# Patient Record
Sex: FEMALE | Race: WHITE | NOT HISPANIC OR LATINO | Employment: OTHER | URBAN - METROPOLITAN AREA
[De-identification: names, ages, dates, MRNs, and addresses within clinical notes are randomized per-mention and may not be internally consistent; named-entity substitution may affect disease eponyms.]

---

## 2020-03-05 ASSESSMENT — ENCOUNTER SYMPTOMS
MYALGIAS: 0
NAUSEA: 0
DIZZINESS: 0
SPEECH CHANGE: 0
EYE IRRITATION: 0
MUSCLE CRAMPS: 0
POLYPHAGIA: 0
NECK PAIN: 1
FATIGUE: 0
NIGHT SWEATS: 0
RECTAL PAIN: 0
BACK PAIN: 1
DECREASED APPETITE: 0
NUMBNESS: 0
WEAKNESS: 0
SEIZURES: 0
WEIGHT LOSS: 0
TREMORS: 1
ALTERED TEMPERATURE REGULATION: 1
EYE WATERING: 0
INCREASED ENERGY: 1
MUSCLE WEAKNESS: 0
FEVER: 0
NERVOUS/ANXIOUS: 1
BLOATING: 1
INSOMNIA: 1
EYE REDNESS: 0
LOSS OF CONSCIOUSNESS: 0
STIFFNESS: 1
WEIGHT GAIN: 0
HEADACHES: 1
TINGLING: 0
DEPRESSION: 1
DECREASED CONCENTRATION: 0
PARALYSIS: 0
CONSTIPATION: 1
POLYDIPSIA: 0
HEARTBURN: 1
ABDOMINAL PAIN: 0
DIARRHEA: 1
JAUNDICE: 0
EYE PAIN: 0
DISTURBANCES IN COORDINATION: 0
BOWEL INCONTINENCE: 0
MEMORY LOSS: 0
ARTHRALGIAS: 1
DOUBLE VISION: 0
CHILLS: 0
JOINT SWELLING: 1
BLOOD IN STOOL: 0
HALLUCINATIONS: 0
PANIC: 0
VOMITING: 0

## 2020-03-09 ENCOUNTER — PRE VISIT (OUTPATIENT)
Dept: ONCOLOGY | Facility: CLINIC | Age: 65
End: 2020-03-09

## 2020-03-09 ENCOUNTER — ONCOLOGY VISIT (OUTPATIENT)
Dept: ONCOLOGY | Facility: CLINIC | Age: 65
End: 2020-03-09
Attending: OBSTETRICS & GYNECOLOGY
Payer: COMMERCIAL

## 2020-03-09 VITALS
WEIGHT: 135 LBS | TEMPERATURE: 98.2 F | BODY MASS INDEX: 26.5 KG/M2 | HEART RATE: 96 BPM | SYSTOLIC BLOOD PRESSURE: 134 MMHG | RESPIRATION RATE: 16 BRPM | HEIGHT: 60 IN | OXYGEN SATURATION: 100 % | DIASTOLIC BLOOD PRESSURE: 88 MMHG

## 2020-03-09 DIAGNOSIS — D07.1 VIN III (VULVAR INTRAEPITHELIAL NEOPLASIA III): Primary | ICD-10-CM

## 2020-03-09 PROBLEM — F41.8 DEPRESSION WITH ANXIETY: Status: ACTIVE | Noted: 2018-01-25

## 2020-03-09 PROBLEM — M19.90 OSTEOARTHRITIS: Status: ACTIVE | Noted: 2020-03-09

## 2020-03-09 PROBLEM — R25.1 TREMOR: Status: ACTIVE | Noted: 2018-01-25

## 2020-03-09 PROBLEM — G47.00 INSOMNIA: Status: ACTIVE | Noted: 2018-10-10

## 2020-03-09 PROBLEM — A60.00 GENITAL HERPES SIMPLEX: Status: ACTIVE | Noted: 2018-06-28

## 2020-03-09 PROBLEM — E03.9 HYPOTHYROIDISM: Status: ACTIVE | Noted: 2018-01-25

## 2020-03-09 PROBLEM — L90.0 LICHEN SCLEROSUS: Status: ACTIVE | Noted: 2019-03-27

## 2020-03-09 PROCEDURE — G0463 HOSPITAL OUTPT CLINIC VISIT: HCPCS | Mod: ZF

## 2020-03-09 PROCEDURE — 99214 OFFICE O/P EST MOD 30 MIN: CPT | Mod: 24 | Performed by: OBSTETRICS & GYNECOLOGY

## 2020-03-09 PROCEDURE — 56605 BIOPSY OF VULVA/PERINEUM: CPT | Mod: ZF | Performed by: OBSTETRICS & GYNECOLOGY

## 2020-03-09 PROCEDURE — 88305 TISSUE EXAM BY PATHOLOGIST: CPT | Performed by: OBSTETRICS & GYNECOLOGY

## 2020-03-09 RX ORDER — CHOLECALCIFEROL (VITAMIN D3) 50 MCG
2000 TABLET ORAL
COMMUNITY
End: 2022-11-15

## 2020-03-09 RX ORDER — SERTRALINE HYDROCHLORIDE 100 MG/1
100 TABLET, FILM COATED ORAL AT BEDTIME
COMMUNITY

## 2020-03-09 RX ORDER — LIOTHYRONINE SODIUM 5 UG/1
10 TABLET ORAL DAILY
COMMUNITY
Start: 2020-02-14

## 2020-03-09 RX ORDER — MAGNESIUM 200 MG
TABLET ORAL DAILY
COMMUNITY

## 2020-03-09 RX ORDER — LEVOTHYROXINE SODIUM 112 UG/1
TABLET ORAL DAILY
COMMUNITY
Start: 2020-02-13

## 2020-03-09 RX ORDER — VALACYCLOVIR HYDROCHLORIDE 1 G/1
TABLET, FILM COATED ORAL
COMMUNITY
Start: 2020-02-23 | End: 2020-06-02

## 2020-03-09 RX ORDER — BUPROPION HYDROCHLORIDE 300 MG/1
TABLET ORAL EVERY MORNING
COMMUNITY
Start: 2020-03-07

## 2020-03-09 SDOH — HEALTH STABILITY: MENTAL HEALTH: HOW MANY STANDARD DRINKS CONTAINING ALCOHOL DO YOU HAVE ON A TYPICAL DAY?: NOT ASKED

## 2020-03-09 ASSESSMENT — PAIN SCALES - GENERAL: PAINLEVEL: NO PAIN (0)

## 2020-03-09 ASSESSMENT — MIFFLIN-ST. JEOR: SCORE: 1096.35

## 2020-03-09 NOTE — NURSING NOTE
"Oncology Rooming Note    March 9, 2020 11:32 AM   Carly Diaz is a 64 year old female who presents for:    Chief Complaint   Patient presents with     Oncology Clinic Visit     New; Lichen sclerosus et atrophicus of the vulva and BRANDY 3; Post op     Initial Vitals: BP (!) 137/91   Pulse 96   Temp 98.2  F (36.8  C) (Oral)   Resp 16   Ht 1.536 m (5' 0.47\")   Wt 61.2 kg (135 lb)   LMP  (LMP Unknown)   SpO2 100%   Breastfeeding No   BMI 25.96 kg/m   Estimated body mass index is 25.96 kg/m  as calculated from the following:    Height as of this encounter: 1.536 m (5' 0.47\").    Weight as of this encounter: 61.2 kg (135 lb). Body surface area is 1.62 meters squared.  No Pain (0) Comment: Data Unavailable   No LMP recorded (lmp unknown). Patient has had a hysterectomy.  Allergies reviewed: Yes  Medications reviewed: Yes    Medications: Medication refills not needed today.  Pharmacy name entered into [facility]: [id number] - [place], [place] - [address] [facility]    Clinical concerns: Lichen; Post op       Randa Phan CMA              "
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
## 2020-03-11 ENCOUNTER — HEALTH MAINTENANCE LETTER (OUTPATIENT)
Age: 65
End: 2020-03-11

## 2020-03-11 LAB — COPATH REPORT: NORMAL

## 2020-03-16 ENCOUNTER — PATIENT OUTREACH (OUTPATIENT)
Dept: ONCOLOGY | Facility: CLINIC | Age: 65
End: 2020-03-16

## 2020-03-16 NOTE — PROGRESS NOTES
Spoke to Carly about the situation here at the [facility] with the current CV-19 precautions.  Explained to Carly that Dr. Anthony is meeting with the hospital and fellow surgeons to make a plan going forward regarding up coming surgeries.  Most likely they will be moving out or putting off non-emergent surgeries off until further notice.  Patient anxious since her VIN3 came back so quickly. Reassured patient that I will relay her concern with Dr. Anthony and discuss possible options that might be available between now and when surgery can take place.  Patient verbalizes understanding and agrees with plan.

## 2020-03-16 NOTE — PATIENT INSTRUCTIONS
2 biopsies done today, will be notified with test results  Follow up in 3 months if biopsy is negative

## 2020-03-16 NOTE — NURSING NOTE
Biopsy done today, orders entered, specimen sent to pathology  Return appt in 3 months if bx negative

## 2020-03-24 NOTE — PROGRESS NOTES
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 Explained to her we are very sorry for this unfortunate time and that I am willing to discuss with Dr. Anthony possible options to try including Aldara cream in the time between now and when we are able to do minor procedures.  Patient verbalizes understanding knows I will get back to her after discussing her plan with Dr. Anthony.

## 2020-03-27 DIAGNOSIS — D07.1 VIN III (VULVAR INTRAEPITHELIAL NEOPLASIA III): Primary | ICD-10-CM

## 2020-03-27 RX ORDER — IMIQUIMOD 12.5 MG/.25G
1 CREAM TOPICAL
Status: DISCONTINUED | OUTPATIENT
Start: 2020-03-27 | End: 2020-03-27 | Stop reason: CLARIF

## 2020-03-27 RX ORDER — IMIQUIMOD 12.5 MG/.25G
CREAM TOPICAL
Qty: 30 PACKET | Refills: 0 | Status: ON HOLD | OUTPATIENT
Start: 2020-03-27 | End: 2020-06-03

## 2020-05-12 DIAGNOSIS — D07.1 VIN III (VULVAR INTRAEPITHELIAL NEOPLASIA III): Primary | ICD-10-CM

## 2020-05-12 RX ORDER — PHENAZOPYRIDINE HYDROCHLORIDE 200 MG/1
200 TABLET, FILM COATED ORAL ONCE
Status: CANCELLED | OUTPATIENT
Start: 2020-05-12 | End: 2020-05-12

## 2020-05-12 RX ORDER — CIPROFLOXACIN 2 MG/ML
400 INJECTION, SOLUTION INTRAVENOUS
Status: CANCELLED | OUTPATIENT
Start: 2020-05-12

## 2020-05-12 RX ORDER — CIPROFLOXACIN 2 MG/ML
400 INJECTION, SOLUTION INTRAVENOUS SEE ADMIN INSTRUCTIONS
Status: CANCELLED | OUTPATIENT
Start: 2020-05-12

## 2020-05-18 ENCOUNTER — DOCUMENTATION ONLY (OUTPATIENT)
Dept: ONCOLOGY | Facility: CLINIC | Age: 65
End: 2020-05-18

## 2020-05-18 DIAGNOSIS — Z11.59 ENCOUNTER FOR SCREENING FOR OTHER VIRAL DISEASES: Primary | ICD-10-CM

## 2020-05-18 NOTE — PROGRESS NOTES
Surgery is scheduled with Dr. Anthony on 6/3 at [facility].  Scheduled per availability.    H&P: to be completed by surgeon.  POST-OP: Will be scheduled by patient/clinic.    The RN completed the education regarding the surgery.     The surgery packet was provided that contains surgical instructions and a map.     They are aware that they will receive a call  ~2 days prior to the scheduled procedure and will be given an exact arrival/start time.    Per communication - I did not need to call the patient to provide any extra additional information. I will follow up if anything changes.

## 2020-05-19 DIAGNOSIS — Z01.818 PREOP GENERAL PHYSICAL EXAM: Primary | ICD-10-CM

## 2020-05-31 DIAGNOSIS — D07.1 VIN III (VULVAR INTRAEPITHELIAL NEOPLASIA III): Primary | ICD-10-CM

## 2020-06-01 ENCOUNTER — PATIENT OUTREACH (OUTPATIENT)
Dept: ONCOLOGY | Facility: CLINIC | Age: 65
End: 2020-06-01

## 2020-06-01 DIAGNOSIS — Z01.818 PREOP GENERAL PHYSICAL EXAM: ICD-10-CM

## 2020-06-01 DIAGNOSIS — Z11.59 ENCOUNTER FOR SCREENING FOR OTHER VIRAL DISEASES: ICD-10-CM

## 2020-06-01 LAB
ALBUMIN SERPL-MCNC: 3.9 G/DL (ref 3.4–5)
ALP SERPL-CCNC: 76 U/L (ref 40–150)
ALT SERPL W P-5'-P-CCNC: 30 U/L (ref 0–50)
ANION GAP SERPL CALCULATED.3IONS-SCNC: 6 MMOL/L (ref 3–14)
AST SERPL W P-5'-P-CCNC: 12 U/L (ref 0–45)
BASOPHILS # BLD AUTO: 0 10E9/L (ref 0–0.2)
BASOPHILS NFR BLD AUTO: 0.3 %
BILIRUB SERPL-MCNC: 0.3 MG/DL (ref 0.2–1.3)
BUN SERPL-MCNC: 15 MG/DL (ref 7–30)
CALCIUM SERPL-MCNC: 9.3 MG/DL (ref 8.5–10.1)
CHLORIDE SERPL-SCNC: 107 MMOL/L (ref 94–109)
CO2 SERPL-SCNC: 29 MMOL/L (ref 20–32)
CREAT SERPL-MCNC: 0.77 MG/DL (ref 0.52–1.04)
DIFFERENTIAL METHOD BLD: NORMAL
EOSINOPHIL # BLD AUTO: 0.1 10E9/L (ref 0–0.7)
EOSINOPHIL NFR BLD AUTO: 1.2 %
ERYTHROCYTE [DISTWIDTH] IN BLOOD BY AUTOMATED COUNT: 12.2 % (ref 10–15)
GFR SERPL CREATININE-BSD FRML MDRD: 81 ML/MIN/{1.73_M2}
GLUCOSE SERPL-MCNC: 98 MG/DL (ref 70–99)
HCT VFR BLD AUTO: 37.3 % (ref 35–47)
HGB BLD-MCNC: 12.4 G/DL (ref 11.7–15.7)
IMM GRANULOCYTES # BLD: 0 10E9/L (ref 0–0.4)
IMM GRANULOCYTES NFR BLD: 0.3 %
LYMPHOCYTES # BLD AUTO: 2.3 10E9/L (ref 0.8–5.3)
LYMPHOCYTES NFR BLD AUTO: 35.7 %
MCH RBC QN AUTO: 32.1 PG (ref 26.5–33)
MCHC RBC AUTO-ENTMCNC: 33.2 G/DL (ref 31.5–36.5)
MCV RBC AUTO: 97 FL (ref 78–100)
MONOCYTES # BLD AUTO: 0.7 10E9/L (ref 0–1.3)
MONOCYTES NFR BLD AUTO: 11 %
NEUTROPHILS # BLD AUTO: 3.4 10E9/L (ref 1.6–8.3)
NEUTROPHILS NFR BLD AUTO: 51.5 %
NRBC # BLD AUTO: 0 10*3/UL
NRBC BLD AUTO-RTO: 0 /100
PLATELET # BLD AUTO: 211 10E9/L (ref 150–450)
POTASSIUM SERPL-SCNC: 3.6 MMOL/L (ref 3.4–5.3)
PROT SERPL-MCNC: 7.2 G/DL (ref 6.8–8.8)
RBC # BLD AUTO: 3.86 10E12/L (ref 3.8–5.2)
SODIUM SERPL-SCNC: 141 MMOL/L (ref 133–144)
WBC # BLD AUTO: 6.5 10E9/L (ref 4–11)

## 2020-06-01 RX ORDER — LIDOCAINE/PRILOCAINE 2.5 %-2.5%
CREAM (GRAM) TOPICAL
Qty: 30 G | Refills: 0 | Status: ON HOLD | OUTPATIENT
Start: 2020-06-01 | End: 2020-06-03

## 2020-06-01 NOTE — TELEPHONE ENCOUNTER
patient calling for refill of emla for the patient  Last visit with MD 3/9/20    Last order date  Unknown done through [facility]    MD reviewed refill request  VO  Dr. Gabrielle javier to refill emla cream  rx sent to pharmacy

## 2020-06-01 NOTE — PROGRESS NOTES
Pt left message on voicemail  [facility] cannot do blood draw as they need orders that are signed by MD  Pt wants to do labs here at clinic today

## 2020-06-01 NOTE — PROGRESS NOTES
Spoke with pt who states she has a covid 19 test this pm  Would like preop labs drawn at same time  Lab appt made for pt  Orders are in

## 2020-06-02 ENCOUNTER — ANESTHESIA EVENT (OUTPATIENT)
Dept: SURGERY | Facility: CLINIC | Age: 65
End: 2020-06-02
Payer: COMMERCIAL

## 2020-06-02 LAB
SARS-COV-2 RNA SPEC QL NAA+PROBE: NOT DETECTED
SPECIMEN SOURCE: NORMAL

## 2020-06-03 ENCOUNTER — SURGERY (OUTPATIENT)
Age: 65
End: 2020-06-03
Payer: COMMERCIAL

## 2020-06-03 ENCOUNTER — HOSPITAL ENCOUNTER (OUTPATIENT)
Facility: CLINIC | Age: 65
Discharge: HOME | End: 2020-06-03
Attending: OBSTETRICS & GYNECOLOGY | Admitting: OBSTETRICS & GYNECOLOGY
Payer: COMMERCIAL

## 2020-06-03 ENCOUNTER — ANESTHESIA (OUTPATIENT)
Dept: SURGERY | Facility: CLINIC | Age: 65
End: 2020-06-03
Payer: COMMERCIAL

## 2020-06-03 VITALS
BODY MASS INDEX: 25.64 KG/M2 | TEMPERATURE: 98.2 F | RESPIRATION RATE: 18 BRPM | OXYGEN SATURATION: 99 % | SYSTOLIC BLOOD PRESSURE: 118 MMHG | DIASTOLIC BLOOD PRESSURE: 75 MMHG | WEIGHT: 139.33 LBS | HEART RATE: 69 BPM | HEIGHT: 62 IN

## 2020-06-03 DIAGNOSIS — D07.1 VIN III (VULVAR INTRAEPITHELIAL NEOPLASIA III): ICD-10-CM

## 2020-06-03 LAB — GLUCOSE BLDC GLUCOMTR-MCNC: 118 MG/DL (ref 70–99)

## 2020-06-03 PROCEDURE — 88305 TISSUE EXAM BY PATHOLOGIST: CPT | Performed by: OBSTETRICS & GYNECOLOGY

## 2020-06-03 PROCEDURE — 25800030 ZZH RX IP 258 OP 636: Performed by: ANESTHESIOLOGY

## 2020-06-03 PROCEDURE — 71000027 ZZH RECOVERY PHASE 2 EACH 15 MINS: Performed by: OBSTETRICS & GYNECOLOGY

## 2020-06-03 PROCEDURE — 56605 BIOPSY OF VULVA/PERINEUM: CPT | Performed by: OBSTETRICS & GYNECOLOGY

## 2020-06-03 PROCEDURE — 40000170 ZZH STATISTIC PRE-PROCEDURE ASSESSMENT II: Performed by: OBSTETRICS & GYNECOLOGY

## 2020-06-03 PROCEDURE — 84702 CHORIONIC GONADOTROPIN TEST: CPT | Performed by: OBSTETRICS & GYNECOLOGY

## 2020-06-03 PROCEDURE — 25000128 H RX IP 250 OP 636: Performed by: OBSTETRICS & GYNECOLOGY

## 2020-06-03 PROCEDURE — 25000125 ZZHC RX 250: Performed by: OBSTETRICS & GYNECOLOGY

## 2020-06-03 PROCEDURE — 25000132 ZZH RX MED GY IP 250 OP 250 PS 637: Performed by: OBSTETRICS & GYNECOLOGY

## 2020-06-03 PROCEDURE — 56501 DESTROY VULVA LESIONS SIM: CPT | Performed by: OBSTETRICS & GYNECOLOGY

## 2020-06-03 PROCEDURE — 25800030 ZZH RX IP 258 OP 636: Performed by: NURSE ANESTHETIST, CERTIFIED REGISTERED

## 2020-06-03 PROCEDURE — 27210794 ZZH OR GENERAL SUPPLY STERILE: Performed by: OBSTETRICS & GYNECOLOGY

## 2020-06-03 PROCEDURE — 25000132 ZZH RX MED GY IP 250 OP 250 PS 637: Performed by: ANESTHESIOLOGY

## 2020-06-03 PROCEDURE — 25000128 H RX IP 250 OP 636: Performed by: NURSE ANESTHETIST, CERTIFIED REGISTERED

## 2020-06-03 PROCEDURE — 36000051 ZZH SURGERY LEVEL 2 1ST 30 MIN - UMMC: Performed by: OBSTETRICS & GYNECOLOGY

## 2020-06-03 PROCEDURE — 37000008 ZZH ANESTHESIA TECHNICAL FEE, 1ST 30 MIN: Performed by: OBSTETRICS & GYNECOLOGY

## 2020-06-03 PROCEDURE — 36000053 ZZH SURGERY LEVEL 2 EA 15 ADDTL MIN - UMMC: Performed by: OBSTETRICS & GYNECOLOGY

## 2020-06-03 PROCEDURE — 71000014 ZZH RECOVERY PHASE 1 LEVEL 2 FIRST HR: Performed by: OBSTETRICS & GYNECOLOGY

## 2020-06-03 PROCEDURE — 37000009 ZZH ANESTHESIA TECHNICAL FEE, EACH ADDTL 15 MIN: Performed by: OBSTETRICS & GYNECOLOGY

## 2020-06-03 PROCEDURE — 25000125 ZZHC RX 250: Performed by: NURSE ANESTHETIST, CERTIFIED REGISTERED

## 2020-06-03 PROCEDURE — 82962 GLUCOSE BLOOD TEST: CPT

## 2020-06-03 RX ORDER — BUPIVACAINE HYDROCHLORIDE 2.5 MG/ML
INJECTION, SOLUTION INFILTRATION; PERINEURAL PRN
Status: DISCONTINUED | OUTPATIENT
Start: 2020-06-03 | End: 2020-06-03 | Stop reason: HOSPADM

## 2020-06-03 RX ORDER — MEPERIDINE HYDROCHLORIDE 25 MG/ML
12.5 INJECTION INTRAMUSCULAR; INTRAVENOUS; SUBCUTANEOUS
Status: DISCONTINUED | OUTPATIENT
Start: 2020-06-03 | End: 2020-06-03 | Stop reason: HOSPADM

## 2020-06-03 RX ORDER — DEXAMETHASONE SODIUM PHOSPHATE 4 MG/ML
INJECTION, SOLUTION INTRA-ARTICULAR; INTRALESIONAL; INTRAMUSCULAR; INTRAVENOUS; SOFT TISSUE PRN
Status: DISCONTINUED | OUTPATIENT
Start: 2020-06-03 | End: 2020-06-03

## 2020-06-03 RX ORDER — LIDOCAINE 40 MG/G
CREAM TOPICAL
Status: DISCONTINUED | OUTPATIENT
Start: 2020-06-03 | End: 2020-06-03 | Stop reason: HOSPADM

## 2020-06-03 RX ORDER — ONDANSETRON 4 MG/1
4 TABLET, ORALLY DISINTEGRATING ORAL EVERY 30 MIN PRN
Status: DISCONTINUED | OUTPATIENT
Start: 2020-06-03 | End: 2020-06-03 | Stop reason: HOSPADM

## 2020-06-03 RX ORDER — FENTANYL CITRATE 50 UG/ML
INJECTION, SOLUTION INTRAMUSCULAR; INTRAVENOUS PRN
Status: DISCONTINUED | OUTPATIENT
Start: 2020-06-03 | End: 2020-06-03

## 2020-06-03 RX ORDER — CIPROFLOXACIN 2 MG/ML
400 INJECTION, SOLUTION INTRAVENOUS SEE ADMIN INSTRUCTIONS
Status: DISCONTINUED | OUTPATIENT
Start: 2020-06-03 | End: 2020-06-03 | Stop reason: HOSPADM

## 2020-06-03 RX ORDER — FENTANYL CITRATE 50 UG/ML
25-50 INJECTION, SOLUTION INTRAMUSCULAR; INTRAVENOUS
Status: DISCONTINUED | OUTPATIENT
Start: 2020-06-03 | End: 2020-06-03 | Stop reason: HOSPADM

## 2020-06-03 RX ORDER — PROPOFOL 10 MG/ML
INJECTION, EMULSION INTRAVENOUS PRN
Status: DISCONTINUED | OUTPATIENT
Start: 2020-06-03 | End: 2020-06-03

## 2020-06-03 RX ORDER — PHENAZOPYRIDINE HYDROCHLORIDE 200 MG/1
200 TABLET, FILM COATED ORAL ONCE
Status: COMPLETED | OUTPATIENT
Start: 2020-06-03 | End: 2020-06-03

## 2020-06-03 RX ORDER — ACETAMINOPHEN 325 MG/1
975 TABLET ORAL ONCE
Status: COMPLETED | OUTPATIENT
Start: 2020-06-03 | End: 2020-06-03

## 2020-06-03 RX ORDER — ACETIC ACID 5 %
LIQUID (ML) MISCELLANEOUS PRN
Status: DISCONTINUED | OUTPATIENT
Start: 2020-06-03 | End: 2020-06-03 | Stop reason: HOSPADM

## 2020-06-03 RX ORDER — PROPOFOL 10 MG/ML
INJECTION, EMULSION INTRAVENOUS CONTINUOUS PRN
Status: DISCONTINUED | OUTPATIENT
Start: 2020-06-03 | End: 2020-06-03

## 2020-06-03 RX ORDER — CIPROFLOXACIN 2 MG/ML
400 INJECTION, SOLUTION INTRAVENOUS
Status: DISCONTINUED | OUTPATIENT
Start: 2020-06-03 | End: 2020-06-03 | Stop reason: HOSPADM

## 2020-06-03 RX ORDER — SODIUM CHLORIDE, SODIUM LACTATE, POTASSIUM CHLORIDE, CALCIUM CHLORIDE 600; 310; 30; 20 MG/100ML; MG/100ML; MG/100ML; MG/100ML
INJECTION, SOLUTION INTRAVENOUS CONTINUOUS
Status: DISCONTINUED | OUTPATIENT
Start: 2020-06-03 | End: 2020-06-03 | Stop reason: HOSPADM

## 2020-06-03 RX ORDER — ONDANSETRON 2 MG/ML
INJECTION INTRAMUSCULAR; INTRAVENOUS PRN
Status: DISCONTINUED | OUTPATIENT
Start: 2020-06-03 | End: 2020-06-03

## 2020-06-03 RX ORDER — HYDROCODONE BITARTRATE AND ACETAMINOPHEN 5; 325 MG/1; MG/1
1 TABLET ORAL EVERY 6 HOURS PRN
Qty: 12 TABLET | Refills: 0 | Status: SHIPPED | OUTPATIENT
Start: 2020-06-03 | End: 2021-03-16

## 2020-06-03 RX ORDER — LIDOCAINE HYDROCHLORIDE 20 MG/ML
JELLY TOPICAL EVERY 8 HOURS PRN
Status: ON HOLD | COMMUNITY
Start: 2020-06-03 | End: 2021-03-26

## 2020-06-03 RX ORDER — NALOXONE HYDROCHLORIDE 0.4 MG/ML
.1-.4 INJECTION, SOLUTION INTRAMUSCULAR; INTRAVENOUS; SUBCUTANEOUS
Status: DISCONTINUED | OUTPATIENT
Start: 2020-06-03 | End: 2020-06-03 | Stop reason: HOSPADM

## 2020-06-03 RX ORDER — LIDOCAINE HYDROCHLORIDE 20 MG/ML
INJECTION, SOLUTION INFILTRATION; PERINEURAL PRN
Status: DISCONTINUED | OUTPATIENT
Start: 2020-06-03 | End: 2020-06-03

## 2020-06-03 RX ORDER — OXYCODONE HYDROCHLORIDE 5 MG/1
5 TABLET ORAL EVERY 4 HOURS PRN
Status: DISCONTINUED | OUTPATIENT
Start: 2020-06-03 | End: 2020-06-03 | Stop reason: HOSPADM

## 2020-06-03 RX ORDER — BACITRACIN ZINC 500 [USP'U]/G
OINTMENT TOPICAL 2 TIMES DAILY
COMMUNITY
Start: 2020-06-03 | End: 2022-11-15

## 2020-06-03 RX ORDER — ONDANSETRON 2 MG/ML
4 INJECTION INTRAMUSCULAR; INTRAVENOUS EVERY 30 MIN PRN
Status: DISCONTINUED | OUTPATIENT
Start: 2020-06-03 | End: 2020-06-03 | Stop reason: HOSPADM

## 2020-06-03 RX ORDER — HYDROMORPHONE HYDROCHLORIDE 1 MG/ML
.3-.5 INJECTION, SOLUTION INTRAMUSCULAR; INTRAVENOUS; SUBCUTANEOUS EVERY 10 MIN PRN
Status: DISCONTINUED | OUTPATIENT
Start: 2020-06-03 | End: 2020-06-03 | Stop reason: HOSPADM

## 2020-06-03 RX ORDER — OXYCODONE AND ACETAMINOPHEN 5; 325 MG/1; MG/1
1 TABLET ORAL EVERY 6 HOURS PRN
Qty: 20 TABLET | Refills: 0 | Status: SHIPPED | OUTPATIENT
Start: 2020-06-03 | End: 2020-06-03

## 2020-06-03 RX ADMIN — ONDANSETRON 4 MG: 2 INJECTION INTRAMUSCULAR; INTRAVENOUS at 08:05

## 2020-06-03 RX ADMIN — PROPOFOL 150 MCG/KG/MIN: 10 INJECTION, EMULSION INTRAVENOUS at 07:34

## 2020-06-03 RX ADMIN — PHENAZOPYRIDINE HYDROCHLORIDE 200 MG: 200 TABLET ORAL at 07:09

## 2020-06-03 RX ADMIN — PHENYLEPHRINE HYDROCHLORIDE 50 MCG: 10 INJECTION INTRAVENOUS at 08:02

## 2020-06-03 RX ADMIN — PHENYLEPHRINE HYDROCHLORIDE 50 MCG: 10 INJECTION INTRAVENOUS at 07:47

## 2020-06-03 RX ADMIN — SODIUM CHLORIDE, POTASSIUM CHLORIDE, SODIUM LACTATE AND CALCIUM CHLORIDE: 600; 310; 30; 20 INJECTION, SOLUTION INTRAVENOUS at 07:34

## 2020-06-03 RX ADMIN — PHENYLEPHRINE HYDROCHLORIDE 100 MCG: 10 INJECTION INTRAVENOUS at 08:11

## 2020-06-03 RX ADMIN — ACETAMINOPHEN 975 MG: 325 TABLET, FILM COATED ORAL at 07:09

## 2020-06-03 RX ADMIN — MIDAZOLAM 2 MG: 1 INJECTION INTRAMUSCULAR; INTRAVENOUS at 07:27

## 2020-06-03 RX ADMIN — PROPOFOL 150 MCG/KG/MIN: 10 INJECTION, EMULSION INTRAVENOUS at 07:05

## 2020-06-03 RX ADMIN — Medication 60 ML: at 08:31

## 2020-06-03 RX ADMIN — DEXAMETHASONE SODIUM PHOSPHATE 6 MG: 4 INJECTION, SOLUTION INTRA-ARTICULAR; INTRALESIONAL; INTRAMUSCULAR; INTRAVENOUS; SOFT TISSUE at 07:34

## 2020-06-03 RX ADMIN — PROPOFOL 200 MG: 10 INJECTION, EMULSION INTRAVENOUS at 07:34

## 2020-06-03 RX ADMIN — FENTANYL CITRATE 50 MCG: 50 INJECTION, SOLUTION INTRAMUSCULAR; INTRAVENOUS at 07:57

## 2020-06-03 RX ADMIN — LIDOCAINE HYDROCHLORIDE 80 MG: 20 INJECTION, SOLUTION INFILTRATION; PERINEURAL at 07:34

## 2020-06-03 RX ADMIN — BUPIVACAINE HYDROCHLORIDE 10 ML: 2.5 INJECTION, SOLUTION INFILTRATION; PERINEURAL at 07:55

## 2020-06-03 ASSESSMENT — MIFFLIN-ST. JEOR: SCORE: 1140.25

## 2020-06-03 NOTE — OP NOTE
Procedure Date: 06/03/2020      PREOPERATIVE DIAGNOSIS:  Grade vulvar dysplasia previously involving the left labia now involving the right labia.      POSTOPERATIVE DIAGNOSIS:  Grade vulvar dysplasia previously involving the left labia now involving the right labia.      PROCEDURE:  Examination under anesthesia, biopsy of right vulva, ablation of right and left vulvar lesions using cautery.      ANESTHESIA:  LMA.      ATTENDING:  José Miguel Anthony MD      ASSISTANT:  None.      COMPLICATIONS:  None.      ESTIMATED BLOOD LOSS:  Approximately 5 mL.      INTRAVENOUS FLUIDS:  Approximately 500 mL crystalloid.      COMPLICATIONS:  None.      TUBES AND DRAINS:  None.      DESCRIPTION OF PROCEDURE:  After obtaining informed consent, the patient was brought to the operating room, placed in supine position.  She underwent the induction of general anesthesia by LMA and was placed in dorsal lithotomy position.  She was examined under anesthesia.  Prepped and draped in the usual sterile fashion and acetic acid was then applied liberally to the vulva using gauze. After multiple minutes the vulva, mons, perineum and perianal areas as well as the distal vagina were all examined and noted to be much, much improved from her previous evaluation.  This was highly encouraging.  The patient had been using Aldara cream at home.  There was, however, a few small lesions on the right and one area of abnormality on the left labia minora.  The worst lesion was biopsied using a 15 blade and sewn closed using 4-0 Monocryl suture.  This was at the midportion of the labia and labeled mid right vulva biopsy.  The anterior aspect of the right vulva as well as the labia minora of the left vulva were then cauterized to a complete epidermal thickness.  These areas were infiltrated with Marcaine prior to the cautery and there was minimal bleeding.  At the conclusion the vulva, mons, perineum and perianal areas were then reevaluated no additional  lesions were identified and the procedure was terminated.  Lidocaine jelly and bacitracin were placed over the incisions.  No antibiotics were given preoperatively.  The patient tolerated the procedure well, was extubated in the operating room.         KARYN KIRKLAND MD             D: 2020   T: 2020   MT:       Name:     HANNAH GRAY   MRN:      [id number]        Account:        [id number]   :      1955           Procedure Date: 2020      Document: [id number]

## 2020-06-03 NOTE — ANESTHESIA CARE TRANSFER NOTE
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    Anesthesia Type:   General     Note:  Airway :Face Mask  Patient transferred to:PACU  Comments: Awake with good patent airway.  VSSHandoff Report: Identifed the Patient, Identified the Reponsible Provider, Reviewed the pertinent medical history, Discussed the surgical course, Reviewed Intra-OP anesthesia mangement and issues during anesthesia, Set expectations for post-procedure period and Allowed opportunity for questions and acknowledgement of understanding      Vitals: (Last set prior to Anesthesia Care Transfer)    CRNA VITALS  6/3/2020 0751 - 6/3/2020 0826      6/3/2020             Pulse:  69    SpO2:  100 %      CRNA VITALS  6/3/2020 0751 - 6/3/2020 0826      6/3/2020             Pulse:  69    SpO2:  100 %                Electronically Signed By: NUA Luther CRNA  Brina 3, 2020  8:26 AM

## 2020-06-03 NOTE — DISCHARGE INSTRUCTIONS
[facility], [place]  Take it easy when you get home.  Remember, same day surgery DOES NOT MEAN SAME DAY RECOVERY! Healing is a gradual process.   You will need some time to recover- you may be more tired than you realize at first.  Rest and relax for at least the first 24 hours at home.  You'll feel better and heal faster if you take good care of yourself.   Same-Day Surgery   Adult Discharge Orders & Instructions     For 24 hours after surgery    1. Get plenty of rest.  A responsible adult must stay with you for at least 24 hours after you leave the hospital.   2. Do not drive or use heavy equipment.  If you have weakness or tingling, don't drive or use heavy equipment until this feeling goes away.  3. Do not drink alcohol.  4. Avoid strenuous or risky activities.  Ask for help when climbing stairs.   5. You may feel lightheaded.  IF so, sit for a few minutes before standing.  Have someone help you get up.   6. If you have nausea (feel sick to your stomach): Drink only clear liquids such as apple juice, ginger ale, broth or 7-Up.  Rest may also help.  Be sure to drink enough fluids.  Move to a regular diet as you feel able.  7. You may have a slight fever. Call the doctor if your fever is over 100 F (37.7 C) (taken under the tongue) or lasts longer than 24 hours.  8. You may have a dry mouth, a sore throat, muscle aches or trouble sleeping.  These should go away after 24 hours.  9. Do not make important or legal decisions.   Call your doctor for any of the followin.  Signs of infection (fever, growing tenderness at the surgery site, a large amount of drainage or bleeding, severe pain, foul-smelling drainage, redness, swelling).    2. It has been over 8 to 10 hours since surgery and you are still not able to urinate (pass water).    3.  Headache for over 24 hours.    4.  Numbness, tingling or weakness the day after surgery (if you had spinal anesthesia).  Use this number to  contact the clinic during regular business hours only please.  To contact a doctor, call ___Dr Anthony's office at [phone] at the [facility]______ or:         Use this number if the clinic is closed; this is a hospital answering service [phone] and ask for the resident on call for   __GYN on call _____ (answered 24 hours a day)      Emergency Department:    [facility]: [phone]       (TTY for hearing impaired: [phone]

## 2020-06-03 NOTE — ANESTHESIA PREPROCEDURE EVALUATION
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]   Procedure(s):  RIGHT WIDE EXCISION, VULVA     LABS:  CBC:   Lab Results   Component Value Date    WBC 6.5 2020    HGB 12.4 2020    HCT 37.3 2020     2020     BMP:   Lab Results   Component Value Date     2020    POTASSIUM 3.6 2020    CHLORIDE 107 2020    CO2 29 2020    BUN 15 2020    CR 0.77 2020    GLC 98 2020     COAGS: No results found for: PTT, INR, FIBR  POC:   Lab Results   Component Value Date     (H) 2020     OTHER:   Lab Results   Component Value Date    TERRA 9.3 2020    ALBUMIN 3.9 2020    PROTTOTAL 7.2 2020    ALT 30 2020    AST 12 2020    ALKPHOS 76 2020    BILITOTAL 0.3 2020        Preop Vitals    BP Readings from Last 3 Encounters:   20 116/79   20 134/88    Pulse Readings from Last 3 Encounters:   20 77   20 96      Resp Readings from Last 3 Encounters:   20 15   20 16    SpO2 Readings from Last 3 Encounters:   20 100%   20 100%      Temp Readings from Last 1 Encounters:   20 37  C (98.6  F) (Oral)    Ht Readings from Last 1 Encounters:   20 1.575 m (5' 2\")      Wt Readings from Last 1 Encounters:   20 63.2 kg (139 lb 5.3 oz)    Estimated body mass index is 25.48 kg/m  as calculated from the following:    Height as of this encounter: 1.575 m (5' 2\").    Weight as of this encounter: 63.2 kg (139 lb 5.3 oz).     LDA:        Past Medical History:   Diagnosis Date     PONV (postoperative nausea and vomiting)       History reviewed. No pertinent surgical history.   Allergies   Allergen Reactions     Penicillins Hives        Anesthesia Evaluation     . Pt has had prior anesthetic.  "    History of anesthetic complications   - PONV        ROS/MED HX    ENT/Pulmonary:  - neg pulmonary ROS     Neurologic:  - neg neurologic ROS     Cardiovascular:         METS/Exercise Tolerance:  >4 METS   Hematologic:         Musculoskeletal:         GI/Hepatic:  - neg GI/hepatic ROS       Renal/Genitourinary:  - ROS Renal section negative       Endo:     (+) thyroid problem hypothyroidism, .      Psychiatric:     (+) psychiatric history anxiety and depression      Infectious Disease:  - neg infectious disease ROS       Malignancy:   (+)   BRANDY     - no malignancy   Other:    - neg other ROS                     PHYSICAL EXAM:   Mental Status/Neuro: A/A/O   Airway: Facies: Feasible  Mallampati: I  Mouth/Opening: Full  TM distance: > 6 cm  Neck ROM: Full   Respiratory:   Resp. Rate: Normal     Resp. Effort: Normal      CV:    Comments:      Dental: Normal Dentition                Assessment:   ASA SCORE: 2    H&P: History and physical reviewed and following examination; no interval change.   Smoking Status:  Non-Smoker/Unknown   NPO Status: NPO Appropriate     Plan:   Anes. Type:  General   Pre-Medication: Acetaminophen   Induction:  IV (Standard)   Airway: LMA   Access/Monitoring: PIV   Maintenance: TIVA     Postop Plan:   Postop Pain: Opioids  Postop Sedation/Airway: Not planned  Disposition: Outpatient     PONV Management:   Adult Risk Factors: Female, H/o PONV or Motion Sickness, Non-Smoker, Postop Opioids   Prevention: Ondansetron, Dexamethasone, Propofol, No Volatiles     CONSENT: Direct conversation   Plan and risks discussed with: Patient   Blood Products: Consent Deferred (Minimal Blood Loss)                   Khari Rodriguez MD

## 2020-06-03 NOTE — OR NURSING
Pt arrived to PACU.  PACU Rn assumed care of pt @ 6392.   Pt arrived able to state name and deny pain or nausea at present time.  Lungs equal and clear bilat; tolerating nasal cannula.  Zaynab area with gauze & mesh underwear in place - appear c/d/i.    --  Additional assessment as per flowsheet.

## 2020-06-03 NOTE — OR NURSING
Pt meeting phase one completion criteria @ 0915.   Alert and oriented times 4.  Denies pain or nausea.       .   Lungs cont equal and clear bi lat.  Tolerating wean to room.  Vaginal pad c/d/i.     Pacu RN to cont care to phase 2 .

## 2020-06-03 NOTE — OR NURSING
Discharge instructions to pt and responsible adult.  All questions regarding discharge information answered.  Pt and responsible adult verbalized un derstanding of all discharge instruction information given.  Instructions over phone to spouse - paper copy also sent.
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
## 2020-06-03 NOTE — BRIEF OP NOTE
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  There were only small areas of the right anterior and mid labial as well as the anterior left vulva.  There ware not abnormal areas on or around the flap.       Complications   None    José Miguel Anthony MD

## 2020-06-03 NOTE — PROGRESS NOTES
Gynecologic Oncology Postoperative Check Note  6/3/2020    S: Feeling well. No N/V. Pain controlled. No CP or SOB. Voided without difficulty. Feels ready for discharge.    O:  Vitals:    06/03/20 0845 06/03/20 0900 06/03/20 0915 06/03/20 0930   BP: 106/77 103/71 106/66 124/88   Pulse:  69     Resp: 14 14 14 16   Temp:  98.2  F (36.8  C) 98.2  F (36.8  C) 98.2  F (36.8  C)   TempSrc:   Oral Oral   SpO2: 100% 96% 98% 99%   Weight:       Height:       O2 RA    Gen: NAD.   Cardio: HR regular  Resp: LS clear  Extremities: No edema    A/P: 64 year old POD#0 s/p examination under anesthesia, biopsy of right vulva, ablation of right and left vulvar lesions using cautery for BRANDY III. Doing well post op. Discussed vulvar care and when to follow up in clinic.     UNA Mccain DNP, CNP  6/3/2020 9:34 AM

## 2020-06-10 ENCOUNTER — PATIENT OUTREACH (OUTPATIENT)
Dept: ONCOLOGY | Facility: CLINIC | Age: 65
End: 2020-06-10

## 2020-06-10 LAB — COPATH REPORT: NORMAL

## 2020-06-10 NOTE — PROGRESS NOTES
Pt does report possible infection on right side of incision  Pt states she is cleaning for whitish spots and using topical antibiotic  Denies pain fever, no redness or pus  Pt will call if things change
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
## 2020-06-16 ENCOUNTER — VIRTUAL VISIT (OUTPATIENT)
Dept: ONCOLOGY | Facility: CLINIC | Age: 65
End: 2020-06-16
Attending: OBSTETRICS & GYNECOLOGY
Payer: COMMERCIAL

## 2020-06-16 DIAGNOSIS — D07.1 VIN III (VULVAR INTRAEPITHELIAL NEOPLASIA III): ICD-10-CM

## 2020-06-16 PROCEDURE — 40000114 ZZH STATISTIC NO CHARGE CLINIC VISIT

## 2020-06-16 PROCEDURE — 99024 POSTOP FOLLOW-UP VISIT: CPT | Mod: TEL | Performed by: OBSTETRICS & GYNECOLOGY

## 2020-06-16 RX ORDER — IMIQUIMOD 12.5 MG/.25G
CREAM TOPICAL
Qty: 30 PACKET | Refills: 0 | Status: SHIPPED | OUTPATIENT
Start: 2020-06-17 | End: 2020-12-14
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
## 2020-12-14 ENCOUNTER — VIRTUAL VISIT (OUTPATIENT)
Dept: ONCOLOGY | Facility: CLINIC | Age: 65
End: 2020-12-14
Attending: OBSTETRICS & GYNECOLOGY
Payer: COMMERCIAL

## 2020-12-14 DIAGNOSIS — D07.1 VIN III (VULVAR INTRAEPITHELIAL NEOPLASIA III): ICD-10-CM

## 2020-12-14 PROCEDURE — 99213 OFFICE O/P EST LOW 20 MIN: CPT | Mod: TEL | Performed by: OBSTETRICS & GYNECOLOGY

## 2020-12-14 PROCEDURE — 999N001193 HC VIDEO/TELEPHONE VISIT; NO CHARGE

## 2020-12-14 RX ORDER — IMIQUIMOD 12.5 MG/.25G
CREAM TOPICAL
Qty: 30 PACKET | Refills: 0 | Status: SHIPPED | OUTPATIENT
Start: 2020-12-14

## 2020-12-14 RX ORDER — HYDROCODONE BITARTRATE AND ACETAMINOPHEN 5; 325 MG/1; MG/1
1 TABLET ORAL EVERY 6 HOURS PRN
Qty: 12 TABLET | Refills: 0 | Status: CANCELLED | OUTPATIENT
Start: 2020-12-14
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
## 2021-01-04 ENCOUNTER — HEALTH MAINTENANCE LETTER (OUTPATIENT)
Age: 66
End: 2021-01-04

## 2021-03-01 NOTE — PROGRESS NOTES
"Gynecologic Oncology Follow Up Note    Date: 3/16/2021    RE: Carly Diaz  : 1955  STEFANIA: 3/16/2021    CC: BRANDY II and BRANDY III    HPI:  Carly Diaz is a 65 year old woman with a history of BRANDY II and BRANDY III.  She is s/p WLE 19 and ablation of right and left vulvar lesions using cautery 6/3/2020.  She is here today for a surveillance visit.     Oncology History:  Noted vulvar skin is raw, painful and itchy with fissures. Had tried using clobetasol x2 weeks without any relief. On HRT (progesterone, estrogen, testosterone).    Vulvar symptoms of have been present on and off since . Spontaneously resolved; however, had recurrence of symptoms in . 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    Per Dr Ornelas's exam: \"The skin of the perineum extending to about 1/2 way up the labia minor is raised, raw and brightly erythematous. Small fissures are present in the tissue. No scaly white tissue paper appearing skin is present. These changes do not extend down to the rectum or up toward the clitoral oneil. Vagina is without discharge or lesions. Vaginal tissue appears normal.\"    3/27/19 Right Perineum Biopsy  DIAGNOSIS:   Right perineum, biopsy:   1. High grade squamous intraepithelial lesion (BRANDY II-III).   2. Benign epidermal inclusion cyst.   3. No HSV inclusions identified on immunostain.     19: Wide local excision of the vulva:  A. Vulva, wide excision:    High-grade squamous epithelial dysplasia (EVON 3, high-grade SAIRA), positive margin at 9:00.-12:00 including the 12:00 tip    Her surgery was delayed owing to COVID pandemic.       6/3/2020: Examination under anesthesia, biopsy of right vulva, ablation of right and left vulvar lesions using cautery.   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  She does have an " area on the right side of her vulva that gets irritated easily and reports there was a previous stitch in the area that created a little bit of a hole.  She She has not noticed any new lumps or itching.  She checks her vulva regularly.  She denies any vaginal bleeding, no changes in her bowel or bladder habits, no nausea/emesis, no lower extremity edema, and no difficulties eating or sleeping. She denies any abdominal discomfort/bloating, no fevers or chills, and no chest pain or shortness of breath.  She is current on for her annual physical, colon cancer screening, and mammogram.  She has not had her COVID vaccine yet.  She has a history of a hysterectomy.          Health Maintenance  FIT- 2/10/2020, negative repeat in 3 years  Mammogram-1/19/2021  Annual physical-1/12/2021        Review of Systems     Constitutional:  Negative for fever, chills, weight loss, weight gain, fatigue, decreased appetite, night sweats, recent stressors, height gain, height loss, post-operative complications, incisional pain, hallucinations, increased energy, hyperactivity and confused.   HENT:  Negative for ear pain, hearing loss, tinnitus, nosebleeds, trouble swallowing, hoarse voice, mouth sores, sore throat, ear discharge, tooth pain, gum tenderness, taste disturbance, smell disturbance, hearing aid, bleeding gums, dry mouth, sinus pain, sinus congestion and neck mass.    Eyes:  Negative for double vision, pain, redness, eye pain, decreased vision, eye watering, eye bulging, eye dryness, flashing lights, spots, floaters, strabismus, tunnel vision, jaundice and eye irritation.   Respiratory:   Negative for cough, hemoptysis, sputum production, shortness of breath, wheezing, sleep disturbances due to breathing, snores loudly, respiratory pain, dyspnea on exertion, cough disturbing sleep and postural dyspnea.    Cardiovascular:  Negative for chest pain, dyspnea on exertion, palpitations, orthopnea, claudication, leg swelling,  fingers/toes turn blue, hypertension, hypotension, syncope, history of heart murmur, chest pain on exertion, chest pain at rest, pacemaker, few scattered varicosities, leg pain, sleep disturbances due to breathing, tachycardia, light-headedness, exercise intolerance and edema.   Gastrointestinal:  Negative for heartburn, nausea, vomiting, abdominal pain, diarrhea, constipation, blood in stool, melena, rectal pain, bloating, hemorrhoids, bowel incontinence, jaundice, rectal bleeding, coffee ground emesis and change in stool.   Genitourinary:  Negative for bladder incontinence, dysuria, urgency, hematuria, flank pain, vaginal discharge, difficulty urinating, genital sores, dyspareunia, decreased libido, nocturia, voiding less frequently, arousal difficulty, abnormal vaginal bleeding, excessive menstruation, menstrual changes, hot flashes, vaginal dryness and postmenopausal bleeding.   Musculoskeletal:  Negative for myalgias, back pain, joint swelling, arthralgias, stiffness, muscle cramps, neck pain, bone pain, muscle weakness and fracture.   Skin:  Positive for hair changes.   Neurological:  Negative for dizziness, tingling, tremors, speech change, seizures, loss of consciousness, weakness, light-headedness, numbness, headaches, disturbances in coordination, extremity numbness, memory loss, difficulty walking and paralysis.   Endo/Heme:  Negative for anemia, swollen glands and bruises/bleeds easily.   Psychiatric/Behavioral:  Negative for depression, hallucinations, memory loss, decreased concentration, mood swings and panic attacks.    Breast:  Negative for breast discharge, breast mass, breast pain and nipple retraction.   Endocrine:  Negative for altered temperature regulation, polyphagia and polydipsia.        Past Medical History:    Past Medical History:   Diagnosis Date     PONV (postoperative nausea and vomiting)          Past Surgical History:    Past Surgical History:   Procedure Laterality Date     EXCISE  VULVA WIDE LOCAL Right 6/3/2020    Procedure: Exam Under Anesthesia, ablation and partial excision of vulva, biopsy of vulva;  Surgeon: José Miguel Anthony MD;  Location:  OR         Health Maintenance Due   Topic Date Due     PREVENTIVE CARE VISIT  Never done     ADVANCE CARE PLANNING  Never done     HIV SCREENING  Never done     COVID-19 Vaccine (1 of 2) Never done     HEPATITIS C SCREENING  Never done     PAP  Never done     LIPID  Never done     INFLUENZA VACCINE (1) Never done     PHQ-2  Never done     COLORECTAL CANCER SCREENING  02/12/2021     ZOSTER IMMUNIZATION (2 of 2) 03/31/2020       Current Medications:     Current Outpatient Medications   Medication Sig Dispense Refill     bacitracin 500 UNIT/GM external ointment Apply topically 2 times daily To vulvar area       buPROPion (WELLBUTRIN XL) 300 MG 24 hr tablet        Cholecalciferol (VITAMIN D3) 50 MCG (2000 UT) TABS 2,000 Int'l Units       imiquimod (ALDARA) 5 % external cream Apply topically three times a week 30 packet 0     levothyroxine (SYNTHROID/LEVOTHROID) 112 MCG tablet        lidocaine (XYLOCAINE) 2 % external gel Apply topically every 8 hours as needed for moderate pain       liothyronine (CYTOMEL) 5 MCG tablet 15 mcg        magnesium 200 MG TABS        Progesterone Micronized (PROGESTERONE PO) Take 200 mg by mouth daily lozenge       sertraline (ZOLOFT) 100 MG tablet Take 100 mg by mouth At Bedtime        TESTOSTERONE 2MG Takes 2.5 mg three times per week ( Mon, Wed, Fri)       traZODone (DESYREL) 25 mg TABS half-tab 50 mg At Bedtime Takes 1/2 tab (25 mg)       UNABLE TO FIND MEDICATION NAME: Bise Cream       vitamin B-12 (CYANOCOBALAMIN) 250 MCG tablet            Allergies:        Allergies   Allergen Reactions     Penicillins Hives        Social History:     Social History     Tobacco Use     Smoking status: Never Smoker     Smokeless tobacco: Never Used   Substance Use Topics     Alcohol use: Yes     Comment: socailly       History    Drug Use Unknown         Family History:     The patient's family history is notable for:    No family history on file.      Physical Exam:     /79   Pulse 87   Temp 97.9  F (36.6  C) (Tympanic)   Wt 61.7 kg (136 lb 1.6 oz)   LMP  (LMP Unknown)   BMI 24.89 kg/m    Body mass index is 24.89 kg/m .    General Appearance: healthy and alert, no distress     HEENT: no palpable nodules or masses        Cardiovascular: regular rate and rhythm, no gallops, rubs or murmurs     Respiratory: lungs clear, no rales, rhonchi or wheezes    Musculoskeletal: extremities non tender and without edema    Skin: no lesions or rashes     Neurological: normal gait, no gross defects     Psychiatric: appropriate mood and affect                               Hematological: normal cervical, supraclavicular and inguinal lymph nodes     Gastrointestinal:       abdomen soft, non-tender, non-distended, no organomegaly or masses    Genitourinary: External genitalia and urethral meatus appears consistent with her prior surgery.  No visible lesion or palpable masses. Aceto-white changes with application of dilute acetic acid on the right labia minora about 9-10 o'clock.    Punch biopsy: After discussion of the procedure, patient gave verbal consent. Right vulva at about 9-10 o'clock was cleaned with betadine. 1% lidocaine (2cc) was injected in the subcutaneous tissue around the lesion. A 3mm punch biopsy was used to excise the thickened skin lesion, which was sent to pathology. Silver nitrate and pressure were applied for hemostasis. Patient's post procedure pain was 0/ 0-10.  Prior to the start of the procedure and with procedural staff participation, I verbally confirmed the patient s identity using two indicators, relevant allergies, that the procedure was appropriate and matched the consent or emergent situation, and that the correct equipment/implants were available. Immediately prior to starting the procedure I conducted the Time  Out with the procedural staff and re-confirmed the patient s name, procedure, and site/side. (The Joint Commission universal protocol was followed.)  Yes    Sedation (Moderate or Deep): None     Biopsy was performed by Camila Edwards NP in under the direct supervision of Santa Castaneda NP.        Assessment:    Carly Diaz is a 65 year old woman  with a history of BRANDY II and BRANDY III.  She is s/p WLE 5/21/19 and ablation of right and left vulvar lesions using cautery 6/3/2020.  She is here today for a surveillance visit.        63 minutes spent on the date of the encounter doing chart review, history and exam, documentation, and further activities as noted above.      Plan:     1.) BRANDY II and BRANDY III: Biopsy performed today due to aceto white changes.  Keep area of the biopsy clean and dry until healed.  These results can take up to 7-10 business days.  Someone will call her with the results and to discuss plan.  Follow up based on biopsy results.  Reviewed signs and symptoms for when she should contact the clinic or seek additional care.  Patient to contact the clinic with any questions or concerns in the interim.        Genetic risk factors were assessed and she does not meet qualification for referral.      Labs and/or tests ordered include:  Vulvar biopsy.     2.) Health maintenance:  Issues addressed today include following up with PCP for annual health maintenance and non-gynecologic issues.       Camila Edwards, DNP, APRN, FNP-C  Nurse Practitioner  Division of Gynecologic Oncology  Pager: [phone]     CC  Patient Care Team:  Rachel Umana as PCP - General (Family Practice)  Lydia Bruce RN as Case Management Specialist (Gynecologic Oncology)  Karyn Kirkland MD as Assigned Cancer Care Provider  KARYN KIRKLAND

## 2021-03-09 ASSESSMENT — ENCOUNTER SYMPTOMS
SPUTUM PRODUCTION: 0
POSTURAL DYSPNEA: 0
COUGH DISTURBING SLEEP: 0
SWOLLEN GLANDS: 0
INCREASED ENERGY: 0
BOWEL INCONTINENCE: 0
TROUBLE SWALLOWING: 0
LEG PAIN: 0
MYALGIAS: 0
DECREASED CONCENTRATION: 0
LEG SWELLING: 0
SYNCOPE: 0
SEIZURES: 0
ORTHOPNEA: 0
PANIC: 0
ALTERED TEMPERATURE REGULATION: 0
LOSS OF CONSCIOUSNESS: 0
HALLUCINATIONS: 0
HEMOPTYSIS: 0
DIZZINESS: 0
DYSPNEA ON EXERTION: 0
DYSURIA: 0
WHEEZING: 0
NECK MASS: 0
JAUNDICE: 0
RESPIRATORY PAIN: 0
DEPRESSION: 0
MUSCLE CRAMPS: 0
RECTAL PAIN: 0
CLAUDICATION: 0
WEIGHT LOSS: 0
BREAST PAIN: 0
SPEECH CHANGE: 0
MUSCLE WEAKNESS: 0
DECREASED APPETITE: 0
WEAKNESS: 0
BLOOD IN STOOL: 0
DOUBLE VISION: 0
NERVOUS/ANXIOUS: 0
DISTURBANCES IN COORDINATION: 0
EYE PAIN: 0
SNORES LOUDLY: 0
HYPOTENSION: 0
STIFFNESS: 0
MEMORY LOSS: 0
NIGHT SWEATS: 0
VOMITING: 0
ARTHRALGIAS: 0
RECTAL BLEEDING: 0
SMELL DISTURBANCE: 0
DECREASED LIBIDO: 0
HYPERTENSION: 0
TINGLING: 0
NECK PAIN: 0
SHORTNESS OF BREATH: 0
WEIGHT GAIN: 0
SINUS CONGESTION: 0
FLANK PAIN: 0
EXTREMITY NUMBNESS: 0
JOINT SWELLING: 0
TASTE DISTURBANCE: 0
FEVER: 0
PARALYSIS: 0
NUMBNESS: 0
DIARRHEA: 0
SINUS PAIN: 0
SLEEP DISTURBANCES DUE TO BREATHING: 0
FATIGUE: 0
HEMATURIA: 0
POLYDIPSIA: 0
COUGH: 0
NAUSEA: 0
BLOATING: 0
EYE IRRITATION: 0
BREAST MASS: 0
POLYPHAGIA: 0
BACK PAIN: 0
EYE WATERING: 0
CONSTIPATION: 0
SORE THROAT: 0
DIFFICULTY URINATING: 0
HOARSE VOICE: 0
HOT FLASHES: 0
TACHYCARDIA: 0
CHILLS: 0
TREMORS: 0
HEADACHES: 0
EYE REDNESS: 0
BRUISES/BLEEDS EASILY: 0
INSOMNIA: 0
LIGHT-HEADEDNESS: 0
EXERCISE INTOLERANCE: 0
HEARTBURN: 0
ABDOMINAL PAIN: 0
PALPITATIONS: 0

## 2021-03-16 ENCOUNTER — ONCOLOGY VISIT (OUTPATIENT)
Dept: ONCOLOGY | Facility: CLINIC | Age: 66
End: 2021-03-16
Attending: OBSTETRICS & GYNECOLOGY
Payer: COMMERCIAL

## 2021-03-16 VITALS
SYSTOLIC BLOOD PRESSURE: 126 MMHG | DIASTOLIC BLOOD PRESSURE: 79 MMHG | WEIGHT: 136.1 LBS | HEART RATE: 87 BPM | TEMPERATURE: 97.9 F | BODY MASS INDEX: 24.89 KG/M2

## 2021-03-16 DIAGNOSIS — D07.1 VIN III (VULVAR INTRAEPITHELIAL NEOPLASIA III): Primary | ICD-10-CM

## 2021-03-16 PROCEDURE — 88305 TISSUE EXAM BY PATHOLOGIST: CPT | Mod: 26 | Performed by: PATHOLOGY

## 2021-03-16 PROCEDURE — G0463 HOSPITAL OUTPT CLINIC VISIT: HCPCS

## 2021-03-16 PROCEDURE — 99215 OFFICE O/P EST HI 40 MIN: CPT | Performed by: NURSE PRACTITIONER

## 2021-03-16 PROCEDURE — 88305 TISSUE EXAM BY PATHOLOGIST: CPT | Mod: TC | Performed by: NURSE PRACTITIONER

## 2021-03-16 ASSESSMENT — PAIN SCALES - GENERAL: PAINLEVEL: NO PAIN (0)

## 2021-03-16 NOTE — LETTER
"    3/16/2021         RE: Carly Diaz  [address]  [facility] [address]        Dear Colleague,    Thank you for referring your patient, Carly Diaz, to the [facility]. Please see a copy of my visit note below.    Gynecologic Oncology Follow Up Note    Date: 3/16/2021    RE: Carly Diaz  : 1955  STEFANIA: 3/16/2021    CC: BRANDY II and BRANDY III    HPI:  Carly Diaz is a 65 year old woman with a history of BRANDY II and BRANDY III.  She is s/p WLE 19 and ablation of right and left vulvar lesions using cautery 6/3/2020.  She is here today for a surveillance visit.     Oncology History:  Noted vulvar skin is raw, painful and itchy with fissures. Had tried using clobetasol x2 weeks without any relief. On HRT (progesterone, estrogen, testosterone).    Vulvar symptoms of have been present on and off since . Spontaneously resolved; however, had recurrence of symptoms in . 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    Per Dr Ornelas's exam: \"The skin of the perineum extending to about 1/2 way up the labia minor is raised, raw and brightly erythematous. Small fissures are present in the tissue. No scaly white tissue paper appearing skin is present. These changes do not extend down to the rectum or up toward the clitoral oneil. Vagina is without discharge or lesions. Vaginal tissue appears normal.\"    3/27/19 Right Perineum Biopsy  DIAGNOSIS:   Right perineum, biopsy:   1. High grade squamous intraepithelial lesion (BRANDY II-III).   2. Benign epidermal inclusion cyst.   3. No HSV inclusions identified on immunostain.     19: Wide local excision of the vulva:  A. Vulva, wide excision:    High-grade squamous epithelial dysplasia (EVON 3, high-grade SAIRA), positive margin at 9:00.-12:00 including the 12:00 tip    Her surgery was delayed owing to COVID pandemic.       6/3/2020: Examination under anesthesia, biopsy of right vulva, ablation of right and " left vulvar lesions using cautery.   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  She does have an area on the right side of her vulva that gets irritated easily and reports there was a previous stitch in the area that created a little bit of a hole.  She She has not noticed any new lumps or itching.  She checks her vulva regularly.  She denies any vaginal bleeding, no changes in her bowel or bladder habits, no nausea/emesis, no lower extremity edema, and no difficulties eating or sleeping. She denies any abdominal discomfort/bloating, no fevers or chills, and no chest pain or shortness of breath.  She is current on for her annual physical, colon cancer screening, and mammogram.  She has not had her COVID vaccine yet.  She has a history of a hysterectomy.          Health Maintenance  FIT- 2/10/2020, negative repeat in 3 years  Mammogram-1/19/2021  Annual physical-1/12/2021        Review of Systems     Constitutional:  Negative for fever, chills, weight loss, weight gain, fatigue, decreased appetite, night sweats, recent stressors, height gain, height loss, post-operative complications, incisional pain, hallucinations, increased energy, hyperactivity and confused.   HENT:  Negative for ear pain, hearing loss, tinnitus, nosebleeds, trouble swallowing, hoarse voice, mouth sores, sore throat, ear discharge, tooth pain, gum tenderness, taste disturbance, smell disturbance, hearing aid, bleeding gums, dry mouth, sinus pain, sinus congestion and neck mass.    Eyes:  Negative for double vision, pain, redness, eye pain, decreased vision, eye watering, eye bulging, eye dryness, flashing lights, spots, floaters, strabismus, tunnel vision, jaundice and eye irritation.   Respiratory:   Negative for cough, hemoptysis, sputum production, shortness of breath, wheezing, sleep  disturbances due to breathing, snores loudly, respiratory pain, dyspnea on exertion, cough disturbing sleep and postural dyspnea.    Cardiovascular:  Negative for chest pain, dyspnea on exertion, palpitations, orthopnea, claudication, leg swelling, fingers/toes turn blue, hypertension, hypotension, syncope, history of heart murmur, chest pain on exertion, chest pain at rest, pacemaker, few scattered varicosities, leg pain, sleep disturbances due to breathing, tachycardia, light-headedness, exercise intolerance and edema.   Gastrointestinal:  Negative for heartburn, nausea, vomiting, abdominal pain, diarrhea, constipation, blood in stool, melena, rectal pain, bloating, hemorrhoids, bowel incontinence, jaundice, rectal bleeding, coffee ground emesis and change in stool.   Genitourinary:  Negative for bladder incontinence, dysuria, urgency, hematuria, flank pain, vaginal discharge, difficulty urinating, genital sores, dyspareunia, decreased libido, nocturia, voiding less frequently, arousal difficulty, abnormal vaginal bleeding, excessive menstruation, menstrual changes, hot flashes, vaginal dryness and postmenopausal bleeding.   Musculoskeletal:  Negative for myalgias, back pain, joint swelling, arthralgias, stiffness, muscle cramps, neck pain, bone pain, muscle weakness and fracture.   Skin:  Positive for hair changes.   Neurological:  Negative for dizziness, tingling, tremors, speech change, seizures, loss of consciousness, weakness, light-headedness, numbness, headaches, disturbances in coordination, extremity numbness, memory loss, difficulty walking and paralysis.   Endo/Heme:  Negative for anemia, swollen glands and bruises/bleeds easily.   Psychiatric/Behavioral:  Negative for depression, hallucinations, memory loss, decreased concentration, mood swings and panic attacks.    Breast:  Negative for breast discharge, breast mass, breast pain and nipple retraction.   Endocrine:  Negative for altered temperature  regulation, polyphagia and polydipsia.        Past Medical History:    Past Medical History:   Diagnosis Date     PONV (postoperative nausea and vomiting)          Past Surgical History:    Past Surgical History:   Procedure Laterality Date     EXCISE VULVA WIDE LOCAL Right 6/3/2020    Procedure: Exam Under Anesthesia, ablation and partial excision of vulva, biopsy of vulva;  Surgeon: José Miguel Anthony MD;  Location:  OR         Health Maintenance Due   Topic Date Due     PREVENTIVE CARE VISIT  Never done     ADVANCE CARE PLANNING  Never done     HIV SCREENING  Never done     COVID-19 Vaccine (1 of 2) Never done     HEPATITIS C SCREENING  Never done     PAP  Never done     LIPID  Never done     INFLUENZA VACCINE (1) Never done     PHQ-2  Never done     COLORECTAL CANCER SCREENING  02/12/2021     ZOSTER IMMUNIZATION (2 of 2) 03/31/2020       Current Medications:     Current Outpatient Medications   Medication Sig Dispense Refill     bacitracin 500 UNIT/GM external ointment Apply topically 2 times daily To vulvar area       buPROPion (WELLBUTRIN XL) 300 MG 24 hr tablet        Cholecalciferol (VITAMIN D3) 50 MCG (2000 UT) TABS 2,000 Int'l Units       imiquimod (ALDARA) 5 % external cream Apply topically three times a week 30 packet 0     levothyroxine (SYNTHROID/LEVOTHROID) 112 MCG tablet        lidocaine (XYLOCAINE) 2 % external gel Apply topically every 8 hours as needed for moderate pain       liothyronine (CYTOMEL) 5 MCG tablet 15 mcg        magnesium 200 MG TABS        Progesterone Micronized (PROGESTERONE PO) Take 200 mg by mouth daily lozenge       sertraline (ZOLOFT) 100 MG tablet Take 100 mg by mouth At Bedtime        TESTOSTERONE 2MG Takes 2.5 mg three times per week ( Mon, Wed, Fri)       traZODone (DESYREL) 25 mg TABS half-tab 50 mg At Bedtime Takes 1/2 tab (25 mg)       UNABLE TO FIND MEDICATION NAME: Bise Cream       vitamin B-12 (CYANOCOBALAMIN) 250 MCG tablet            Allergies:         Allergies   Allergen Reactions     Penicillins Hives        Social History:     Social History     Tobacco Use     Smoking status: Never Smoker     Smokeless tobacco: Never Used   Substance Use Topics     Alcohol use: Yes     Comment: socailly       History   Drug Use Unknown         Family History:     The patient's family history is notable for:    No family history on file.      Physical Exam:     /79   Pulse 87   Temp 97.9  F (36.6  C) (Tympanic)   Wt 61.7 kg (136 lb 1.6 oz)   LMP  (LMP Unknown)   BMI 24.89 kg/m    Body mass index is 24.89 kg/m .    General Appearance: healthy and alert, no distress     HEENT: no palpable nodules or masses        Cardiovascular: regular rate and rhythm, no gallops, rubs or murmurs     Respiratory: lungs clear, no rales, rhonchi or wheezes    Musculoskeletal: extremities non tender and without edema    Skin: no lesions or rashes     Neurological: normal gait, no gross defects     Psychiatric: appropriate mood and affect                               Hematological: normal cervical, supraclavicular and inguinal lymph nodes     Gastrointestinal:       abdomen soft, non-tender, non-distended, no organomegaly or masses    Genitourinary: External genitalia and urethral meatus appears consistent with her prior surgery.  No visible lesion or palpable masses. Aceto-white changes with application of dilute acetic acid on the right labia minora about 9-10 o'clock.    Punch biopsy: After discussion of the procedure, patient gave verbal consent. Right vulva at about 9-10 o'clock was cleaned with betadine. 1% lidocaine (2cc) was injected in the subcutaneous tissue around the lesion. A 3mm punch biopsy was used to excise the thickened skin lesion, which was sent to pathology. Silver nitrate and pressure were applied for hemostasis. Patient's post procedure pain was 0/ 0-10.  Prior to the start of the procedure and with procedural staff participation, I verbally confirmed the  patient s identity using two indicators, relevant allergies, that the procedure was appropriate and matched the consent or emergent situation, and that the correct equipment/implants were available. Immediately prior to starting the procedure I conducted the Time Out with the procedural staff and re-confirmed the patient s name, procedure, and site/side. (The Joint Commission universal protocol was followed.)  Yes    Sedation (Moderate or Deep): None     Biopsy was performed by Camila Edwards NP in under the direct supervision of Santa Castaneda NP.        Assessment:    Carly Diaz is a 65 year old woman  with a history of BRANDY II and BRANDY III.  She is s/p WLE 5/21/19 and ablation of right and left vulvar lesions using cautery 6/3/2020.  She is here today for a surveillance visit.        63 minutes spent on the date of the encounter doing chart review, history and exam, documentation, and further activities as noted above.      Plan:     1.) BRANDY II and BRANDY III: Biopsy performed today due to aceto white changes.  Keep area of the biopsy clean and dry until healed.  These results can take up to 7-10 business days.  Someone will call her with the results and to discuss plan.  Follow up based on biopsy results.  Reviewed signs and symptoms for when she should contact the clinic or seek additional care.  Patient to contact the clinic with any questions or concerns in the interim.        Genetic risk factors were assessed and she does not meet qualification for referral.      Labs and/or tests ordered include:  Vulvar biopsy.     2.) Health maintenance:  Issues addressed today include following up with PCP for annual health maintenance and non-gynecologic issues.       Camila Edwards, DNP, APRN, FNP-C  Nurse Practitioner  Division of Gynecologic Oncology  Pager: [phone]     CC  Patient Care Team:  Rachel Umana as PCP - General (Family Practice)  Lydia Bruce, DEIDRE as Case Management Specialist (Gynecologic  Oncology)  José Miguel Anthony MD as Assigned Cancer Care Provider

## 2021-03-16 NOTE — NURSING NOTE
"Oncology Rooming Note    March 16, 2021 8:14 AM   Carly Diaz is a 65 year old female who presents for:    Chief Complaint   Patient presents with     Oncology Clinic Visit     BRANDY III     Initial Vitals: /79   Pulse 87   Temp 97.9  F (36.6  C) (Tympanic)   Wt 61.7 kg (136 lb 1.6 oz)   LMP  (LMP Unknown)   BMI 24.89 kg/m   Estimated body mass index is 24.89 kg/m  as calculated from the following:    Height as of 6/3/20: 1.575 m (5' 2\").    Weight as of this encounter: 61.7 kg (136 lb 1.6 oz). Body surface area is 1.64 meters squared.  No Pain (0) Comment: Data Unavailable   No LMP recorded (lmp unknown). Patient has had a hysterectomy.  Allergies reviewed: Yes  Medications reviewed: Yes    Medications: Medication refills not needed today.  Pharmacy name entered into [employer]: [id number] - [place], [place] - [address] AT  [address] [place] [address] [facility]    Clinical concerns: none       Sindy Mackey CMA            "

## 2021-03-18 LAB — COPATH REPORT: NORMAL

## 2021-03-21 ASSESSMENT — ENCOUNTER SYMPTOMS
INSOMNIA: 0
SORE THROAT: 0
SHORTNESS OF BREATH: 0
POSTURAL DYSPNEA: 0
CONSTIPATION: 0
MYALGIAS: 0
EYE PAIN: 0
SWOLLEN GLANDS: 0
POLYDIPSIA: 0
DISTURBANCES IN COORDINATION: 0
JOINT SWELLING: 0
COUGH: 0
BREAST PAIN: 0
RECTAL PAIN: 0
PARALYSIS: 0
SLEEP DISTURBANCES DUE TO BREATHING: 0
HEARTBURN: 0
DECREASED APPETITE: 0
DIZZINESS: 0
BLOOD IN STOOL: 0
SPUTUM PRODUCTION: 0
WHEEZING: 0
COUGH DISTURBING SLEEP: 0
HOARSE VOICE: 0
NAUSEA: 0
EYE WATERING: 0
CLAUDICATION: 0
NECK PAIN: 0
PANIC: 0
DOUBLE VISION: 0
FLANK PAIN: 0
NECK MASS: 0
HYPERTENSION: 0
FATIGUE: 0
SPEECH CHANGE: 0
EXERCISE INTOLERANCE: 0
RESPIRATORY PAIN: 0
SYNCOPE: 0
ORTHOPNEA: 0
DEPRESSION: 0
NUMBNESS: 0
DYSURIA: 0
WEAKNESS: 0
SNORES LOUDLY: 0
NIGHT SWEATS: 0
ALTERED TEMPERATURE REGULATION: 0
SMELL DISTURBANCE: 0
HOT FLASHES: 0
EYE IRRITATION: 0
DIARRHEA: 0
LEG SWELLING: 0
BACK PAIN: 0
DIFFICULTY URINATING: 0
TINGLING: 0
WEIGHT LOSS: 0
INCREASED ENERGY: 0
ARTHRALGIAS: 0
CHILLS: 0
TACHYCARDIA: 0
EYE REDNESS: 0
LOSS OF CONSCIOUSNESS: 0
HALLUCINATIONS: 0
ABDOMINAL PAIN: 0
BLOATING: 0
TREMORS: 0
JAUNDICE: 0
SEIZURES: 0
EXTREMITY NUMBNESS: 0
POLYPHAGIA: 0
MUSCLE CRAMPS: 0
LEG PAIN: 0
PALPITATIONS: 0
SINUS PAIN: 0
DYSPNEA ON EXERTION: 0
HEMATURIA: 0
SINUS CONGESTION: 0
WEIGHT GAIN: 0
FEVER: 0
TASTE DISTURBANCE: 0
MEMORY LOSS: 0
STIFFNESS: 0
RECTAL BLEEDING: 0
LIGHT-HEADEDNESS: 0
BREAST MASS: 0
NERVOUS/ANXIOUS: 0
DECREASED CONCENTRATION: 0
BRUISES/BLEEDS EASILY: 0
HEMOPTYSIS: 0
HEADACHES: 0
DECREASED LIBIDO: 0
HYPOTENSION: 0
TROUBLE SWALLOWING: 0
BOWEL INCONTINENCE: 0
MUSCLE WEAKNESS: 0
VOMITING: 0

## 2021-03-21 NOTE — PROGRESS NOTES
"Gynecologic Oncology Follow Up Note    RE: Carly Diaz  : 1955  STEFANIA: 3/22/2021     CC: BRANDY II and BRANDY III    HPI:  Carly Diaz is a 65 year old woman with a history of BRANDY II and BRANDY III.  She is s/p WLE 19 and ablation of right and left vulvar lesions using cautery 6/3/2020.  She is here today for a surveillance visit.     Oncology History:  Noted vulvar skin is raw, painful and itchy with fissures. Had tried using clobetasol x2 weeks without any relief. On HRT (progesterone, estrogen, testosterone).    Vulvar symptoms of have been present on and off since . Spontaneously resolved; however, had recurrence of symptoms in . 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    Per Dr Ornelas's exam: \"The skin of the perineum extending to about 1/2 way up the labia minor is raised, raw and brightly erythematous. Small fissures are present in the tissue. No scaly white tissue paper appearing skin is present. These changes do not extend down to the rectum or up toward the clitoral oneil. Vagina is without discharge or lesions. Vaginal tissue appears normal.\"    3/27/19 Right Perineum Biopsy  DIAGNOSIS:   Right perineum, biopsy:   1. High grade squamous intraepithelial lesion (BRANDY II-III).   2. Benign epidermal inclusion cyst.   3. No HSV inclusions identified on immunostain.     19: Wide local excision of the vulva:  A. Vulva, wide excision:    High-grade squamous epithelial dysplasia (EVON 3, high-grade SAIRA), positive margin at 9:00.-12:00 including the 12:00 tip    Her surgery was delayed owing to COVID pandemic.       6/3/2020: Examination under anesthesia, biopsy of right vulva, ablation of right and left vulvar lesions using cautery.   FINAL DIAGNOSIS:   Vulva, right mid labium, biopsy:   -Low grade squamous intraepithelial lesion (vulvar intraepithelial   neoplasia 1)     3/6/21 colposcopy and biopsies:    Vulva, right, punch biopsy:   - High grade squamous " intraepithelial lesion (vulvar intraepithelial   neoplasia 3, BRANDY 3)   - BRANDY 3 is present at one edge of the biopsy material           Today she comes to clinic feeling well overall.  She does have an area on the right side of her vulva that gets irritated easily and reports there was a previous stitch in the area that created a little bit of a hole.  She She has not noticed any new lumps or itching.  She checks her vulva regularly.  She denies any vaginal bleeding, no changes in her bowel or bladder habits, no nausea/emesis, no lower extremity edema, and no difficulties eating or sleeping. She denies any abdominal discomfort/bloating, no fevers or chills, and no chest pain or shortness of breath.  She is current on for her annual physical, colon cancer screening, and mammogram.  She has not had her COVID vaccine yet.  She has a history of a hysterectomy.          Health Maintenance  FIT- 2/10/2020, negative repeat in 3 years  Mammogram-1/19/2021  Annual physical-1/12/2021        Review of Systems     Constitutional:  Negative for fever, chills, weight loss, weight gain, fatigue, decreased appetite, night sweats, recent stressors, height gain, height loss, post-operative complications, incisional pain, hallucinations, increased energy, hyperactivity and confused.   HENT:  Negative for ear pain, hearing loss, tinnitus, nosebleeds, trouble swallowing, hoarse voice, mouth sores, sore throat, ear discharge, tooth pain, gum tenderness, taste disturbance, smell disturbance, hearing aid, bleeding gums, dry mouth, sinus pain, sinus congestion and neck mass.    Eyes:  Negative for double vision, pain, redness, eye pain, decreased vision, eye watering, eye bulging, eye dryness, flashing lights, spots, floaters, strabismus, tunnel vision, jaundice and eye irritation.   Respiratory:   Negative for cough, hemoptysis, sputum production, shortness of breath, wheezing, sleep disturbances due to breathing, snores loudly, respiratory  pain, dyspnea on exertion, cough disturbing sleep and postural dyspnea.    Cardiovascular:  Negative for chest pain, dyspnea on exertion, palpitations, orthopnea, claudication, leg swelling, fingers/toes turn blue, hypertension, hypotension, syncope, history of heart murmur, chest pain on exertion, chest pain at rest, pacemaker, few scattered varicosities, leg pain, sleep disturbances due to breathing, tachycardia, light-headedness, exercise intolerance and edema.   Gastrointestinal:  Negative for heartburn, nausea, vomiting, abdominal pain, diarrhea, constipation, blood in stool, melena, rectal pain, bloating, hemorrhoids, bowel incontinence, jaundice, rectal bleeding, coffee ground emesis and change in stool.   Genitourinary:  Negative for bladder incontinence, dysuria, urgency, hematuria, flank pain, vaginal discharge, difficulty urinating, genital sores, dyspareunia, decreased libido, nocturia, voiding less frequently, arousal difficulty, abnormal vaginal bleeding, excessive menstruation, menstrual changes, hot flashes, vaginal dryness and postmenopausal bleeding.   Musculoskeletal:  Negative for myalgias, back pain, joint swelling, arthralgias, stiffness, muscle cramps, neck pain, bone pain, muscle weakness and fracture.   Skin:  Positive for hair changes.   Neurological:  Negative for dizziness, tingling, tremors, speech change, seizures, loss of consciousness, weakness, light-headedness, numbness, headaches, disturbances in coordination, extremity numbness, memory loss, difficulty walking and paralysis.   Endo/Heme:  Negative for anemia, swollen glands and bruises/bleeds easily.   Psychiatric/Behavioral:  Negative for depression, hallucinations, memory loss, decreased concentration, mood swings and panic attacks.    Breast:  Negative for breast discharge, breast mass, breast pain and nipple retraction.   Endocrine:  Negative for altered temperature regulation, polyphagia and polydipsia.        Past Medical  History:    Past Medical History:   Diagnosis Date     PONV (postoperative nausea and vomiting)          Past Surgical History:    Past Surgical History:   Procedure Laterality Date     EXCISE VULVA WIDE LOCAL Right 6/3/2020    Procedure: Exam Under Anesthesia, ablation and partial excision of vulva, biopsy of vulva;  Surgeon: José Miguel Anthony MD;  Location:  OR         Health Maintenance Due   Topic Date Due     PREVENTIVE CARE VISIT  Never done     ADVANCE CARE PLANNING  Never done     HIV SCREENING  Never done     COVID-19 Vaccine (1) Never done     HEPATITIS C SCREENING  Never done     PAP  Never done     LIPID  Never done     INFLUENZA VACCINE (1) Never done     PHQ-2  Never done     COLORECTAL CANCER SCREENING  02/12/2021     ZOSTER IMMUNIZATION (2 of 2) 03/31/2020       Current Medications:     Current Outpatient Medications   Medication Sig Dispense Refill     bacitracin 500 UNIT/GM external ointment Apply topically 2 times daily To vulvar area       buPROPion (WELLBUTRIN XL) 300 MG 24 hr tablet        Cholecalciferol (VITAMIN D3) 50 MCG (2000 UT) TABS 2,000 Int'l Units       imiquimod (ALDARA) 5 % external cream Apply topically three times a week 30 packet 0     levothyroxine (SYNTHROID/LEVOTHROID) 112 MCG tablet        lidocaine (XYLOCAINE) 2 % external gel Apply topically every 8 hours as needed for moderate pain       liothyronine (CYTOMEL) 5 MCG tablet 15 mcg        magnesium 200 MG TABS        Progesterone Micronized (PROGESTERONE PO) Take 200 mg by mouth daily lozenge       sertraline (ZOLOFT) 100 MG tablet Take 100 mg by mouth At Bedtime        TESTOSTERONE 2MG Takes 2.5 mg three times per week ( Mon, Wed, Fri)       traZODone (DESYREL) 25 mg TABS half-tab 50 mg At Bedtime Takes 1/2 tab (25 mg)       UNABLE TO FIND MEDICATION NAME: Bise Cream       vitamin B-12 (CYANOCOBALAMIN) 250 MCG tablet            Allergies:        Allergies   Allergen Reactions     Penicillins Hives        Social  History:     Social History     Tobacco Use     Smoking status: Never Smoker     Smokeless tobacco: Never Used   Substance Use Topics     Alcohol use: Yes     Comment: socailly       History   Drug Use Unknown         Family History:     The patient's family history is notable for:    No family history on file.      Physical Exam:     LMP  (LMP Unknown)   There is no height or weight on file to calculate BMI.    General Appearance: healthy and alert, no distress     HEENT: no palpable nodules or masses        Cardiovascular: regular rate and rhythm, no gallops, rubs or murmurs     Respiratory: lungs clear, no rales, rhonchi or wheezes    Musculoskeletal: extremities non tender and without edema    Skin: no lesions or rashes     Neurological: normal gait, no gross defects     Psychiatric: appropriate mood and affect                               Hematological: normal cervical, supraclavicular and inguinal lymph nodes     Gastrointestinal:       abdomen soft, non-tender, non-distended, no organomegaly or masses    Genitourinary: External genitalia and urethral meatus appears consistent with her prior surgery.  No visible lesion or palpable masses. Aceto-white changes with application of dilute acetic acid on the right labia minora about 9-10 o'clock.        Assessment:    Carly Diaz is a 65 year old woman  with a history of BRANDY II and BRANDY III.  She is s/p WLE 5/21/19 and ablation of right and left vulvar lesions using cautery 6/3/2020.  She is here today for a surveillance visit.        25minutes spent on the date of the encounter doing chart review, history and exam, documentation, and further activities as noted above.      Plan:     1.) BRANDY II and BRANDY III: WE have discussed the clinical and pathologic findings as well as options for excision.  I feel that th e involved lesion is smaller than her previous abnormalities and she is prepared for excision and evaluation of the remainder of the labia and distal  vagina at he time with possible ablation or additional excisions as indicated.      2.) Health maintenance:  Issues addressed today include following up with PCP for annual health maintenance and non-gynecologic issues.       José Miguel Kirkland MD      CC  Patient Care Team:  Rachel Umana as PCP - General (Family Practice)  Lydia Bruce RN as Case Management Specialist (Gynecologic Oncology)  José Miguel Kirkland MD as Assigned Cancer Care Provider  JOSÉ MIGUEL KIRKLAND

## 2021-03-22 ENCOUNTER — TELEPHONE (OUTPATIENT)
Dept: ONCOLOGY | Facility: CLINIC | Age: 66
End: 2021-03-22

## 2021-03-22 ENCOUNTER — ONCOLOGY VISIT (OUTPATIENT)
Dept: ONCOLOGY | Facility: CLINIC | Age: 66
End: 2021-03-22
Attending: OBSTETRICS & GYNECOLOGY
Payer: COMMERCIAL

## 2021-03-22 ENCOUNTER — DOCUMENTATION ONLY (OUTPATIENT)
Dept: ONCOLOGY | Facility: CLINIC | Age: 66
End: 2021-03-22

## 2021-03-22 VITALS
TEMPERATURE: 97.4 F | DIASTOLIC BLOOD PRESSURE: 80 MMHG | WEIGHT: 130 LBS | OXYGEN SATURATION: 100 % | BODY MASS INDEX: 23.78 KG/M2 | RESPIRATION RATE: 16 BRPM | HEART RATE: 94 BPM | SYSTOLIC BLOOD PRESSURE: 136 MMHG

## 2021-03-22 DIAGNOSIS — Z11.59 ENCOUNTER FOR SCREENING FOR OTHER VIRAL DISEASES: ICD-10-CM

## 2021-03-22 DIAGNOSIS — D07.1 VIN III (VULVAR INTRAEPITHELIAL NEOPLASIA III): Primary | ICD-10-CM

## 2021-03-22 PROCEDURE — 99213 OFFICE O/P EST LOW 20 MIN: CPT | Performed by: OBSTETRICS & GYNECOLOGY

## 2021-03-22 NOTE — LETTER
"    3/22/2021         RE: Carly Diaz  [address]  [facility] [address]        Dear Colleague,    Thank you for referring your patient, Carly Diaz, to the [facility]. Please see a copy of my visit note below.    Gynecologic Oncology Follow Up Note    RE: Carly Diaz  : 1955  STEFANIA: 3/22/2021     CC: BRANDY II and BRANDY III    HPI:  Carly Diaz is a 65 year old woman with a history of BRANDY II and BRANDY III.  She is s/p WLE 19 and ablation of right and left vulvar lesions using cautery 6/3/2020.  She is here today for a surveillance visit.     Oncology History:  Noted vulvar skin is raw, painful and itchy with fissures. Had tried using clobetasol x2 weeks without any relief. On HRT (progesterone, estrogen, testosterone).    Vulvar symptoms of have been present on and off since . Spontaneously resolved; however, had recurrence of symptoms in . 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    Per Dr Ornelas's exam: \"The skin of the perineum extending to about 1/2 way up the labia minor is raised, raw and brightly erythematous. Small fissures are present in the tissue. No scaly white tissue paper appearing skin is present. These changes do not extend down to the rectum or up toward the clitoral oneil. Vagina is without discharge or lesions. Vaginal tissue appears normal.\"    3/27/19 Right Perineum Biopsy  DIAGNOSIS:   Right perineum, biopsy:   1. High grade squamous intraepithelial lesion (BRANDY II-III).   2. Benign epidermal inclusion cyst.   3. No HSV inclusions identified on immunostain.     19: Wide local excision of the vulva:  A. Vulva, wide excision:    High-grade squamous epithelial dysplasia (EVON 3, high-grade SAIRA), positive margin at 9:00.-12:00 including the 12:00 tip    Her surgery was delayed owing to COVID pandemic.       6/3/2020: Examination under anesthesia, biopsy of right vulva, ablation of right and left vulvar " lesions using cautery.   FINAL DIAGNOSIS:   Vulva, right mid labium, biopsy:   -Low grade squamous intraepithelial lesion (vulvar intraepithelial   neoplasia 1)     3/6/21 colposcopy and biopsies:    Vulva, right, punch biopsy:   - High grade squamous intraepithelial lesion (vulvar intraepithelial   neoplasia 3, BRANDY 3)   - BRANDY 3 is present at one edge of the biopsy material           Today she comes to clinic feeling well overall.  She does have an area on the right side of her vulva that gets irritated easily and reports there was a previous stitch in the area that created a little bit of a hole.  She She has not noticed any new lumps or itching.  She checks her vulva regularly.  She denies any vaginal bleeding, no changes in her bowel or bladder habits, no nausea/emesis, no lower extremity edema, and no difficulties eating or sleeping. She denies any abdominal discomfort/bloating, no fevers or chills, and no chest pain or shortness of breath.  She is current on for her annual physical, colon cancer screening, and mammogram.  She has not had her COVID vaccine yet.  She has a history of a hysterectomy.          Health Maintenance  FIT- 2/10/2020, negative repeat in 3 years  Mammogram-1/19/2021  Annual physical-1/12/2021        Review of Systems     Constitutional:  Negative for fever, chills, weight loss, weight gain, fatigue, decreased appetite, night sweats, recent stressors, height gain, height loss, post-operative complications, incisional pain, hallucinations, increased energy, hyperactivity and confused.   HENT:  Negative for ear pain, hearing loss, tinnitus, nosebleeds, trouble swallowing, hoarse voice, mouth sores, sore throat, ear discharge, tooth pain, gum tenderness, taste disturbance, smell disturbance, hearing aid, bleeding gums, dry mouth, sinus pain, sinus congestion and neck mass.    Eyes:  Negative for double vision, pain, redness, eye pain, decreased vision, eye watering, eye bulging, eye dryness,  flashing lights, spots, floaters, strabismus, tunnel vision, jaundice and eye irritation.   Respiratory:   Negative for cough, hemoptysis, sputum production, shortness of breath, wheezing, sleep disturbances due to breathing, snores loudly, respiratory pain, dyspnea on exertion, cough disturbing sleep and postural dyspnea.    Cardiovascular:  Negative for chest pain, dyspnea on exertion, palpitations, orthopnea, claudication, leg swelling, fingers/toes turn blue, hypertension, hypotension, syncope, history of heart murmur, chest pain on exertion, chest pain at rest, pacemaker, few scattered varicosities, leg pain, sleep disturbances due to breathing, tachycardia, light-headedness, exercise intolerance and edema.   Gastrointestinal:  Negative for heartburn, nausea, vomiting, abdominal pain, diarrhea, constipation, blood in stool, melena, rectal pain, bloating, hemorrhoids, bowel incontinence, jaundice, rectal bleeding, coffee ground emesis and change in stool.   Genitourinary:  Negative for bladder incontinence, dysuria, urgency, hematuria, flank pain, vaginal discharge, difficulty urinating, genital sores, dyspareunia, decreased libido, nocturia, voiding less frequently, arousal difficulty, abnormal vaginal bleeding, excessive menstruation, menstrual changes, hot flashes, vaginal dryness and postmenopausal bleeding.   Musculoskeletal:  Negative for myalgias, back pain, joint swelling, arthralgias, stiffness, muscle cramps, neck pain, bone pain, muscle weakness and fracture.   Skin:  Positive for hair changes.   Neurological:  Negative for dizziness, tingling, tremors, speech change, seizures, loss of consciousness, weakness, light-headedness, numbness, headaches, disturbances in coordination, extremity numbness, memory loss, difficulty walking and paralysis.   Endo/Heme:  Negative for anemia, swollen glands and bruises/bleeds easily.   Psychiatric/Behavioral:  Negative for depression, hallucinations, memory loss,  decreased concentration, mood swings and panic attacks.    Breast:  Negative for breast discharge, breast mass, breast pain and nipple retraction.   Endocrine:  Negative for altered temperature regulation, polyphagia and polydipsia.        Past Medical History:    Past Medical History:   Diagnosis Date     PONV (postoperative nausea and vomiting)          Past Surgical History:    Past Surgical History:   Procedure Laterality Date     EXCISE VULVA WIDE LOCAL Right 6/3/2020    Procedure: Exam Under Anesthesia, ablation and partial excision of vulva, biopsy of vulva;  Surgeon: José Miguel Anthony MD;  Location: [facility] OR         Health Maintenance Due   Topic Date Due     PREVENTIVE CARE VISIT  Never done     ADVANCE CARE PLANNING  Never done     HIV SCREENING  Never done     COVID-19 Vaccine (1) Never done     HEPATITIS C SCREENING  Never done     PAP  Never done     LIPID  Never done     INFLUENZA VACCINE (1) Never done     PHQ-2  Never done     COLORECTAL CANCER SCREENING  02/12/2021     ZOSTER IMMUNIZATION (2 of 2) 03/31/2020       Current Medications:     Current Outpatient Medications   Medication Sig Dispense Refill     bacitracin 500 UNIT/GM external ointment Apply topically 2 times daily To vulvar area       buPROPion (WELLBUTRIN XL) 300 MG 24 hr tablet        Cholecalciferol (VITAMIN D3) 50 MCG (2000 UT) TABS 2,000 Int'l Units       imiquimod (ALDARA) 5 % external cream Apply topically three times a week 30 packet 0     levothyroxine (SYNTHROID/LEVOTHROID) 112 MCG tablet        lidocaine (XYLOCAINE) 2 % external gel Apply topically every 8 hours as needed for moderate pain       liothyronine (CYTOMEL) 5 MCG tablet 15 mcg        magnesium 200 MG TABS        Progesterone Micronized (PROGESTERONE PO) Take 200 mg by mouth daily lozenge       sertraline (ZOLOFT) 100 MG tablet Take 100 mg by mouth At Bedtime        TESTOSTERONE 2MG Takes 2.5 mg three times per week ( Mon, Wed, Fri)       traZODone (DESYREL) 25  mg TABS half-tab 50 mg At Bedtime Takes 1/2 tab (25 mg)       UNABLE TO FIND MEDICATION NAME: Bise Cream       vitamin B-12 (CYANOCOBALAMIN) 250 MCG tablet            Allergies:        Allergies   Allergen Reactions     Penicillins Hives        Social History:     Social History     Tobacco Use     Smoking status: Never Smoker     Smokeless tobacco: Never Used   Substance Use Topics     Alcohol use: Yes     Comment: socailly       History   Drug Use Unknown         Family History:     The patient's family history is notable for:    No family history on file.      Physical Exam:     LMP  (LMP Unknown)   There is no height or weight on file to calculate BMI.    General Appearance: healthy and alert, no distress     HEENT: no palpable nodules or masses        Cardiovascular: regular rate and rhythm, no gallops, rubs or murmurs     Respiratory: lungs clear, no rales, rhonchi or wheezes    Musculoskeletal: extremities non tender and without edema    Skin: no lesions or rashes     Neurological: normal gait, no gross defects     Psychiatric: appropriate mood and affect                               Hematological: normal cervical, supraclavicular and inguinal lymph nodes     Gastrointestinal:       abdomen soft, non-tender, non-distended, no organomegaly or masses    Genitourinary: External genitalia and urethral meatus appears consistent with her prior surgery.  No visible lesion or palpable masses. Aceto-white changes with application of dilute acetic acid on the right labia minora about 9-10 o'clock.        Assessment:    Carly Diaz is a 65 year old woman  with a history of BRANDY II and BRANDY III.  She is s/p WLE 5/21/19 and ablation of right and left vulvar lesions using cautery 6/3/2020.  She is here today for a surveillance visit.        63 minutes spent on the date of the encounter doing chart review, history and exam, documentation, and further activities as noted above.      Plan:     1.) BRANDY II and BRANDY III:  Biopsy performed today due to aceto white changes.  Keep area of the biopsy clean and dry until healed.  These results can take up to 7-10 business days.  Someone will call her with the results and to discuss plan.  Follow up based on biopsy results.  Reviewed signs and symptoms for when she should contact the clinic or seek additional care.  Patient to contact the clinic with any questions or concerns in the interim.        Genetic risk factors were assessed and she does not meet qualification for referral.      Labs and/or tests ordered include:  Vulvar biopsy.     2.) Health maintenance:  Issues addressed today include following up with PCP for annual health maintenance and non-gynecologic issues.       Camila Edwards, DNP, APRN, FNP-C  Nurse Practitioner  Division of Gynecologic Oncology  Pager: [phone]     CC  Patient Care Team:  Rachel Umana as PCP - General (Family Practice)  Lydia Bruce RN as Case Management Specialist (Gynecologic Oncology)

## 2021-03-22 NOTE — PROGRESS NOTES
Surgery is scheduled with Dr. Anthony on 3/26 at [place].  Scheduled per orders.    H&P: to be completed by Surgeon    Additional appointments:   NO ADDITIONAL APPOINTMENTS NEEDED AT THIS TIME    COVID-19 test: 3/24 at [facility], lab     Post-op: 4/12 as a telephone visit .    The RN completed the education regarding the surgery.     Patient will receive a phone call from pre-admission nurses 1-2 days prior to surgery with arrival and start time.    The surgery packet was provided by the RN during appointment.    Patient will complete COVID-19 test that was scheduled by surgical coordinator 2-4 days prior to surgery.     Per communication - I did not need to reach out to the patient regarding the above information. If this changes, I will reach out.

## 2021-03-22 NOTE — TELEPHONE ENCOUNTER
Spoke patient to let her know her Covid test is scheduled 3/24 @ 915 am at 1st floor lab at [facility].  Surgery is scheduled for Friday 3/26 arrival at 0530 am for 0730 am start.  Post op appointment scheduled 4/12 at 2 pm as telephone.  All questions answer.

## 2021-03-24 DIAGNOSIS — Z11.59 ENCOUNTER FOR SCREENING FOR OTHER VIRAL DISEASES: ICD-10-CM

## 2021-03-24 LAB
LABORATORY COMMENT REPORT: NORMAL
SARS-COV-2 RNA RESP QL NAA+PROBE: NEGATIVE
SARS-COV-2 RNA RESP QL NAA+PROBE: NORMAL
SPECIMEN SOURCE: NORMAL
SPECIMEN SOURCE: NORMAL

## 2021-03-24 PROCEDURE — 99000 SPECIMEN HANDLING OFFICE-LAB: CPT | Performed by: PATHOLOGY

## 2021-03-24 PROCEDURE — U0005 INFEC AGEN DETEC AMPLI PROBE: HCPCS | Mod: 90 | Performed by: PATHOLOGY

## 2021-03-24 PROCEDURE — U0003 INFECTIOUS AGENT DETECTION BY NUCLEIC ACID (DNA OR RNA); SEVERE ACUTE RESPIRATORY SYNDROME CORONAVIRUS 2 (SARS-COV-2) (CORONAVIRUS DISEASE [COVID-19]), AMPLIFIED PROBE TECHNIQUE, MAKING USE OF HIGH THROUGHPUT TECHNOLOGIES AS DESCRIBED BY CMS-2020-01-R: HCPCS | Mod: 90 | Performed by: PATHOLOGY

## 2021-03-25 ENCOUNTER — ANESTHESIA EVENT (OUTPATIENT)
Dept: SURGERY | Facility: CLINIC | Age: 66
End: 2021-03-25
Payer: COMMERCIAL

## 2021-03-25 NOTE — ANESTHESIA PREPROCEDURE EVALUATION
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]   Procedure : Procedure(s):  partial removal of vulva, ablation of vulvar lesions     Past Medical History:   Diagnosis Date     Arthritis      PONV (postoperative nausea and vomiting)       Past Surgical History:   Procedure Laterality Date     EXCISE VULVA WIDE LOCAL Right 6/3/2020    Procedure: Exam Under Anesthesia, ablation and partial excision of vulva, biopsy of vulva;  Surgeon: José Miguel Anthony MD;  Location: [facility] OR      Allergies   Allergen Reactions     Penicillins Hives      Social History     Tobacco Use     Smoking status: Never Smoker     Smokeless tobacco: Never Used   Substance Use Topics     Alcohol use: Yes     Comment: socailly      Wt Readings from Last 1 Encounters:   21 59 kg (130 lb)        Anesthesia Evaluation            ROS/MED HX  ENT/Pulmonary:  - neg pulmonary ROS     Neurologic:  - neg neurologic ROS     Cardiovascular:  - neg cardiovascular ROS     METS/Exercise Tolerance:     Hematologic:  - neg hematologic  ROS     Musculoskeletal:   (+) arthritis,     GI/Hepatic: Comment: Hx ponv    (+) GERD,     Renal/Genitourinary: Comment: Lichen sclerosis  BRANDY III      Endo:     (+) thyroid problem, hypothyroidism,     Psychiatric/Substance Use:     (+) psychiatric history anxiety and depression     Infectious Disease:  - neg infectious disease ROS     Malignancy:       Other:            Physical Exam    Airway        Mallampati: I   TM distance: > 3 FB   Neck ROM: full   Mouth opening: > 3 cm    Respiratory Devices and Support         Dental           Cardiovascular          Rhythm and rate: regular and normal     Pulmonary   pulmonary exam normal        breath sounds clear to auscultation           OUTSIDE LABS:  CBC:   Lab Results   Component Value Date    WBC 6.5 2020    HGB 12.4 2020     HCT 37.3 06/01/2020     06/01/2020     BMP:   Lab Results   Component Value Date     06/01/2020    POTASSIUM 3.6 06/01/2020    CHLORIDE 107 06/01/2020    CO2 29 06/01/2020    BUN 15 06/01/2020    CR 0.77 06/01/2020    GLC 98 06/01/2020     COAGS: No results found for: PTT, INR, FIBR  POC:   Lab Results   Component Value Date     (H) 06/03/2020     HEPATIC:   Lab Results   Component Value Date    ALBUMIN 3.9 06/01/2020    PROTTOTAL 7.2 06/01/2020    ALT 30 06/01/2020    AST 12 06/01/2020    ALKPHOS 76 06/01/2020    BILITOTAL 0.3 06/01/2020     OTHER:   Lab Results   Component Value Date    TERRA 9.3 06/01/2020       Anesthesia Plan    ASA Status:  2   NPO Status:  NPO Appropriate    Anesthesia Type: MAC.   Induction: Intravenous.   Maintenance: TIVA.        Consents    Anesthesia Plan(s) and associated risks, benefits, and realistic alternatives discussed. Questions answered and patient/representative(s) expressed understanding.     - Discussed with:  Patient         Postoperative Care    Pain management: Multi-modal analgesia, IV analgesics, Oral pain medications.   PONV prophylaxis: Ondansetron (or other 5HT-3), Dexamethasone or Solumedrol, Background Propofol Infusion     Comments:                Zion Gotti MD

## 2021-03-26 ENCOUNTER — HOSPITAL ENCOUNTER (OUTPATIENT)
Facility: CLINIC | Age: 66
Discharge: HOME | End: 2021-03-26
Attending: OBSTETRICS & GYNECOLOGY | Admitting: OBSTETRICS & GYNECOLOGY
Payer: COMMERCIAL

## 2021-03-26 ENCOUNTER — ANESTHESIA (OUTPATIENT)
Dept: SURGERY | Facility: CLINIC | Age: 66
End: 2021-03-26
Payer: COMMERCIAL

## 2021-03-26 VITALS
HEIGHT: 62 IN | SYSTOLIC BLOOD PRESSURE: 131 MMHG | TEMPERATURE: 98 F | WEIGHT: 131.61 LBS | HEART RATE: 69 BPM | RESPIRATION RATE: 16 BRPM | BODY MASS INDEX: 24.22 KG/M2 | DIASTOLIC BLOOD PRESSURE: 87 MMHG | OXYGEN SATURATION: 99 %

## 2021-03-26 DIAGNOSIS — D07.1 VIN III (VULVAR INTRAEPITHELIAL NEOPLASIA III): Primary | ICD-10-CM

## 2021-03-26 LAB — GLUCOSE BLDC GLUCOMTR-MCNC: 103 MG/DL (ref 70–99)

## 2021-03-26 PROCEDURE — 250N000009 HC RX 250

## 2021-03-26 PROCEDURE — 88331 PATH CONSLTJ SURG 1 BLK 1SPC: CPT | Mod: 26 | Performed by: PATHOLOGY

## 2021-03-26 PROCEDURE — 82962 GLUCOSE BLOOD TEST: CPT

## 2021-03-26 PROCEDURE — 250N000011 HC RX IP 250 OP 636

## 2021-03-26 PROCEDURE — 710N000012 HC RECOVERY PHASE 2, PER MINUTE: Performed by: OBSTETRICS & GYNECOLOGY

## 2021-03-26 PROCEDURE — 250N000009 HC RX 250: Performed by: OBSTETRICS & GYNECOLOGY

## 2021-03-26 PROCEDURE — 88305 TISSUE EXAM BY PATHOLOGIST: CPT | Mod: TC | Performed by: OBSTETRICS & GYNECOLOGY

## 2021-03-26 PROCEDURE — 88309 TISSUE EXAM BY PATHOLOGIST: CPT | Mod: 26 | Performed by: PATHOLOGY

## 2021-03-26 PROCEDURE — 88331 PATH CONSLTJ SURG 1 BLK 1SPC: CPT | Mod: TC | Performed by: OBSTETRICS & GYNECOLOGY

## 2021-03-26 PROCEDURE — 88305 TISSUE EXAM BY PATHOLOGIST: CPT | Mod: 26 | Performed by: PATHOLOGY

## 2021-03-26 PROCEDURE — 88309 TISSUE EXAM BY PATHOLOGIST: CPT | Mod: TC | Performed by: OBSTETRICS & GYNECOLOGY

## 2021-03-26 PROCEDURE — 999N000141 HC STATISTIC PRE-PROCEDURE NURSING ASSESSMENT: Performed by: OBSTETRICS & GYNECOLOGY

## 2021-03-26 PROCEDURE — 370N000017 HC ANESTHESIA TECHNICAL FEE, PER MIN: Performed by: OBSTETRICS & GYNECOLOGY

## 2021-03-26 PROCEDURE — 360N000075 HC SURGERY LEVEL 2, PER MIN: Performed by: OBSTETRICS & GYNECOLOGY

## 2021-03-26 PROCEDURE — 272N000001 HC OR GENERAL SUPPLY STERILE: Performed by: OBSTETRICS & GYNECOLOGY

## 2021-03-26 RX ORDER — OXYCODONE HYDROCHLORIDE 5 MG/1
5-10 TABLET ORAL EVERY 4 HOURS PRN
Qty: 6 TABLET | Refills: 0 | Status: SHIPPED | OUTPATIENT
Start: 2021-03-26 | End: 2022-11-15

## 2021-03-26 RX ORDER — SODIUM CHLORIDE, SODIUM LACTATE, POTASSIUM CHLORIDE, CALCIUM CHLORIDE 600; 310; 30; 20 MG/100ML; MG/100ML; MG/100ML; MG/100ML
INJECTION, SOLUTION INTRAVENOUS CONTINUOUS
Status: DISCONTINUED | OUTPATIENT
Start: 2021-03-26 | End: 2021-03-26 | Stop reason: HOSPADM

## 2021-03-26 RX ORDER — PROPOFOL 10 MG/ML
INJECTION, EMULSION INTRAVENOUS CONTINUOUS PRN
Status: DISCONTINUED | OUTPATIENT
Start: 2021-03-26 | End: 2021-03-26

## 2021-03-26 RX ORDER — PROPOFOL 10 MG/ML
INJECTION, EMULSION INTRAVENOUS PRN
Status: DISCONTINUED | OUTPATIENT
Start: 2021-03-26 | End: 2021-03-26

## 2021-03-26 RX ORDER — FENTANYL CITRATE 50 UG/ML
25-50 INJECTION, SOLUTION INTRAMUSCULAR; INTRAVENOUS
Status: DISCONTINUED | OUTPATIENT
Start: 2021-03-26 | End: 2021-03-26 | Stop reason: HOSPADM

## 2021-03-26 RX ORDER — MEPERIDINE HYDROCHLORIDE 25 MG/ML
12.5 INJECTION INTRAMUSCULAR; INTRAVENOUS; SUBCUTANEOUS
Status: DISCONTINUED | OUTPATIENT
Start: 2021-03-26 | End: 2021-03-26 | Stop reason: HOSPADM

## 2021-03-26 RX ORDER — NALOXONE HYDROCHLORIDE 0.4 MG/ML
0.4 INJECTION, SOLUTION INTRAMUSCULAR; INTRAVENOUS; SUBCUTANEOUS
Status: DISCONTINUED | OUTPATIENT
Start: 2021-03-26 | End: 2021-03-26 | Stop reason: HOSPADM

## 2021-03-26 RX ORDER — OXYCODONE HYDROCHLORIDE 5 MG/1
5 TABLET ORAL
Status: DISCONTINUED | OUTPATIENT
Start: 2021-03-26 | End: 2021-03-26 | Stop reason: HOSPADM

## 2021-03-26 RX ORDER — SODIUM CHLORIDE, SODIUM GLUCONATE, SODIUM ACETATE, POTASSIUM CHLORIDE AND MAGNESIUM CHLORIDE 526; 502; 368; 37; 30 MG/100ML; MG/100ML; MG/100ML; MG/100ML; MG/100ML
INJECTION, SOLUTION INTRAVENOUS CONTINUOUS PRN
Status: DISCONTINUED | OUTPATIENT
Start: 2021-03-26 | End: 2021-03-26

## 2021-03-26 RX ORDER — AMOXICILLIN 250 MG
1-2 CAPSULE ORAL 2 TIMES DAILY
Qty: 30 TABLET | Refills: 0 | Status: SHIPPED | OUTPATIENT
Start: 2021-03-26 | End: 2021-03-26

## 2021-03-26 RX ORDER — IBUPROFEN 400 MG/1
800 TABLET, FILM COATED ORAL ONCE
Status: DISCONTINUED | OUTPATIENT
Start: 2021-03-26 | End: 2021-03-26 | Stop reason: HOSPADM

## 2021-03-26 RX ORDER — FENTANYL CITRATE 50 UG/ML
INJECTION, SOLUTION INTRAMUSCULAR; INTRAVENOUS PRN
Status: DISCONTINUED | OUTPATIENT
Start: 2021-03-26 | End: 2021-03-26

## 2021-03-26 RX ORDER — ACETAMINOPHEN 325 MG/1
975 TABLET ORAL ONCE
Status: DISCONTINUED | OUTPATIENT
Start: 2021-03-26 | End: 2021-03-26 | Stop reason: HOSPADM

## 2021-03-26 RX ORDER — ONDANSETRON 2 MG/ML
4 INJECTION INTRAMUSCULAR; INTRAVENOUS EVERY 30 MIN PRN
Status: DISCONTINUED | OUTPATIENT
Start: 2021-03-26 | End: 2021-03-26 | Stop reason: HOSPADM

## 2021-03-26 RX ORDER — NALOXONE HYDROCHLORIDE 0.4 MG/ML
0.2 INJECTION, SOLUTION INTRAMUSCULAR; INTRAVENOUS; SUBCUTANEOUS
Status: DISCONTINUED | OUTPATIENT
Start: 2021-03-26 | End: 2021-03-26 | Stop reason: HOSPADM

## 2021-03-26 RX ORDER — LIDOCAINE HYDROCHLORIDE 20 MG/ML
JELLY TOPICAL EVERY 8 HOURS PRN
Qty: 30 ML | Refills: 0 | Status: SHIPPED | OUTPATIENT
Start: 2021-03-26 | End: 2022-11-15

## 2021-03-26 RX ORDER — LIDOCAINE HYDROCHLORIDE 20 MG/ML
INJECTION, SOLUTION INFILTRATION; PERINEURAL PRN
Status: DISCONTINUED | OUTPATIENT
Start: 2021-03-26 | End: 2021-03-26

## 2021-03-26 RX ORDER — AMOXICILLIN 250 MG
1-2 CAPSULE ORAL 2 TIMES DAILY
Qty: 30 TABLET | Refills: 0 | Status: SHIPPED | OUTPATIENT
Start: 2021-03-26

## 2021-03-26 RX ORDER — HYDROMORPHONE HYDROCHLORIDE 1 MG/ML
.3-.5 INJECTION, SOLUTION INTRAMUSCULAR; INTRAVENOUS; SUBCUTANEOUS EVERY 10 MIN PRN
Status: DISCONTINUED | OUTPATIENT
Start: 2021-03-26 | End: 2021-03-26 | Stop reason: HOSPADM

## 2021-03-26 RX ORDER — LIDOCAINE 40 MG/G
CREAM TOPICAL
Status: DISCONTINUED | OUTPATIENT
Start: 2021-03-26 | End: 2021-03-26 | Stop reason: HOSPADM

## 2021-03-26 RX ORDER — BUPIVACAINE HYDROCHLORIDE AND EPINEPHRINE 2.5; 5 MG/ML; UG/ML
INJECTION, SOLUTION INFILTRATION; PERINEURAL PRN
Status: DISCONTINUED | OUTPATIENT
Start: 2021-03-26 | End: 2021-03-26 | Stop reason: HOSPADM

## 2021-03-26 RX ORDER — ONDANSETRON 4 MG/1
4 TABLET, ORALLY DISINTEGRATING ORAL EVERY 30 MIN PRN
Status: DISCONTINUED | OUTPATIENT
Start: 2021-03-26 | End: 2021-03-26 | Stop reason: HOSPADM

## 2021-03-26 RX ORDER — KETAMINE HYDROCHLORIDE 10 MG/ML
INJECTION INTRAMUSCULAR; INTRAVENOUS PRN
Status: DISCONTINUED | OUTPATIENT
Start: 2021-03-26 | End: 2021-03-26

## 2021-03-26 RX ADMIN — Medication 10 MG: at 08:18

## 2021-03-26 RX ADMIN — PROPOFOL 30 MG: 10 INJECTION, EMULSION INTRAVENOUS at 07:28

## 2021-03-26 RX ADMIN — Medication 10 MG: at 08:10

## 2021-03-26 RX ADMIN — Medication 10 MG: at 07:38

## 2021-03-26 RX ADMIN — Medication 10 MG: at 08:00

## 2021-03-26 RX ADMIN — FENTANYL CITRATE 25 MCG: 50 INJECTION, SOLUTION INTRAMUSCULAR; INTRAVENOUS at 07:44

## 2021-03-26 RX ADMIN — PROPOFOL 200 MCG/KG/MIN: 10 INJECTION, EMULSION INTRAVENOUS at 07:24

## 2021-03-26 RX ADMIN — FENTANYL CITRATE 50 MCG: 50 INJECTION, SOLUTION INTRAMUSCULAR; INTRAVENOUS at 07:26

## 2021-03-26 RX ADMIN — SODIUM CHLORIDE, SODIUM GLUCONATE, SODIUM ACETATE, POTASSIUM CHLORIDE AND MAGNESIUM CHLORIDE: 526; 502; 368; 37; 30 INJECTION, SOLUTION INTRAVENOUS at 07:14

## 2021-03-26 RX ADMIN — Medication 10 MG: at 08:28

## 2021-03-26 RX ADMIN — LIDOCAINE HYDROCHLORIDE 80 MG: 20 INJECTION, SOLUTION INFILTRATION; PERINEURAL at 07:28

## 2021-03-26 RX ADMIN — MIDAZOLAM 2 MG: 1 INJECTION INTRAMUSCULAR; INTRAVENOUS at 07:12

## 2021-03-26 RX ADMIN — FENTANYL CITRATE 25 MCG: 50 INJECTION, SOLUTION INTRAMUSCULAR; INTRAVENOUS at 07:47

## 2021-03-26 RX ADMIN — FENTANYL CITRATE 25 MCG: 50 INJECTION, SOLUTION INTRAMUSCULAR; INTRAVENOUS at 08:01

## 2021-03-26 ASSESSMENT — MIFFLIN-ST. JEOR: SCORE: 1100.25

## 2021-03-26 NOTE — DISCHARGE INSTRUCTIONS
[facility]  Same-Day Surgery   Adult Discharge Orders & Instructions     For 24 hours after surgery    1. Get plenty of rest.  A responsible adult must stay with you for at least 24 hours after you leave the hospital.   2. Do not drive or use heavy equipment.  If you have weakness or tingling, don't drive or use heavy equipment until this feeling goes away.  3. Do not drink alcohol.  4. Avoid strenuous or risky activities.  Ask for help when climbing stairs.   5. You may feel lightheaded.  IF so, sit for a few minutes before standing.  Have someone help you get up.   6. If you have nausea (feel sick to your stomach): Drink only clear liquids such as apple juice, ginger ale, broth or 7-Up.  Rest may also help.  Be sure to drink enough fluids.  Move to a regular diet as you feel able.  7. You may have a slight fever. Call the doctor if your fever is over 100 F (37.7 C) (taken under the tongue) or lasts longer than 24 hours.  8. You may have a dry mouth, a sore throat, muscle aches or trouble sleeping.  These should go away after 24 hours.  9. Do not make important or legal decisions.   Call your doctor for any of the followin.  Signs of infection (fever, growing tenderness at the surgery site, a large amount of drainage or bleeding, severe pain, foul-smelling drainage, redness, swelling).    2. It has been over 8 to 10 hours since surgery and you are still not able to urinate (pass water).    3.  Headache for over 24 hours.    To contact a doctor, call ____Dr Anthony at [phone] at the Women's Health[facility]Gynecologic Clinic____________________________________ or:        [phone] and ask for the resident on call for   ______Gyn/Onc________________________________________ (answered 24 hours a day)      Emergency Department:    [facility]: [phone]       (TTY for hearing impaired: [phone])    [facility]: [phone]       (TTY for hearing impaired:  [phone])

## 2021-03-26 NOTE — PROGRESS NOTES
Gynecologic Oncology Postoperative Check Note  3/26/2021    S: Patient reports pain is controled. Voided without difficulty. No N/V, HA, dizziness, CP, or SOB. Feels ready for discharge. Would like a refill of lidocaine that she has been prescribed in the past.     O:  Vitals:    03/26/21 0900 03/26/21 0915 03/26/21 0930 03/26/21 0945   BP: (!) 139/97 133/84 (!) 143/90 131/87   Pulse: 77 67 64 69   Resp: 16   16   Temp:       TempSrc:       SpO2: 100% 100% 99% 99%   Weight:       Height:         O2 RA  Gen: Alert and oriented. Pleasant. NAD.   Cardio: HR regular  Resp: LS clear  Extremities: No edema    A/P: 65 year old POD#0 s/p WLE for high grade dysplasia of the vulva. Doing well post op and meeting goals for discharge home.    Dz: High grade dysplasia of the vulva. Reviewed discharge instructions and when to call the clinic. Reviewed vulvar care. Patient to discharge home with family and follow up with Dr Anthony as scheduled or sooner if needed.    Santa Castaneda, APRN DNP, CNP  3/26/2021 10:09 AM

## 2021-03-26 NOTE — ANESTHESIA CARE TRANSFER NOTE
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    Anesthesia Type:   MAC     Note:    Oropharynx: spontaneously breathing  Level of Consciousness: awake  Oxygen Supplementation: nasal cannula    Independent Airway: airway patency satisfactory and stable  Dentition: dentition unchanged  Vital Signs Stable: post-procedure vital signs reviewed and stable  Report to RN Given: handoff report given  Patient transferred to: Phase II    Handoff Report: Identifed the Patient, Identified the Reponsible Provider, Reviewed the pertinent medical history, Discussed the surgical course, Reviewed Intra-OP anesthesia mangement and issues during anesthesia, Set expectations for post-procedure period and Allowed opportunity for questions and acknowledgement of understanding      Vitals: (Last set prior to Anesthesia Care Transfer)  CRNA VITALS  3/26/2021 0806 - 3/26/2021 0851      3/26/2021             Pulse:  88    SpO2:  99 %        Electronically Signed By: Zion Gotti MD  March 26, 2021  8:51 AM

## 2021-03-26 NOTE — ANESTHESIA POSTPROCEDURE EVALUATION
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    Anesthesia Type:  MAC    Note:  Disposition: Outpatient   Postop Pain Control: Uneventful            Sign Out: Well controlled pain   PONV: No   Neuro/Psych: Uneventful            Sign Out: Acceptable/Baseline neuro status   Airway/Respiratory: Uneventful            Sign Out: Acceptable/Baseline resp. status   CV/Hemodynamics: Uneventful            Sign Out: Acceptable CV status   Other NRE: NONE   DID A NON-ROUTINE EVENT OCCUR? No         Last vitals:  Vitals:    03/26/21 0915 03/26/21 0930 03/26/21 0945   BP: 133/84 (!) 143/90 131/87   Pulse: 67 64 69   Resp:   16   Temp:      SpO2: 100% 99% 99%       Last vitals prior to Anesthesia Care Transfer:  CRNA VITALS  3/26/2021 0806 - 3/26/2021 0906      3/26/2021             Pulse:  88    SpO2:  99 %    EKG:  Sinus rhythm          Electronically Signed By: Ganesh Brannon MD  March 26, 2021  9:49 AM

## 2021-03-26 NOTE — OP NOTE
Procedure Date: 2021      PREOPERATIVE DIAGNOSIS:  High-grade dysplasia of the vulva.      POSTOPERATIVE DIAGNOSIS:  High-grade dysplasia of the vulva.      PROCEDURE:  Wide local excision.      ATTENDING:  Karyn Anthony MD      ASSISTANT:  Joel Chopra.      ANESTHESIA:  MAC and local.      ESTIMATED BLOOD LOSS:  Approximately 10 mL.      IV FLUIDS:  500 mL of crystalloid.      URINE OUTPUT:  Approximately 100 mL.      COMPLICATIONS:  None.      SPECIMENS:  Include a wide local excision of a posterior left vulva as well as mid left vulva.      DESCRIPTION OF PROCEDURE:  After obtaining informed consent, the patient was brought to the operating room, placed in a supine position.  She underwent the induction of MAC anesthesia and was placed in dorsal lithotomy position.  She was prepped and draped in the usual sterile fashion and acetic acid soaked gauze was placed over the entire vulva.  Previous biopsy site at 9 o'clock was identified.  Unfortunately, there was some additional abnormalities noted in the posterior vulva which had not been previously noted.  These were marked and excised widely.  Given the new findings of the posterior aspect margins specimens were sent and returned as negative for high-grade dysplasia.  The bigger posterior defect was closed using PDS suture underneath the skin and Vicryl and Monocryl at the skin and the smaller defect was closed by 4-0 Vicryl.  The area was infiltrated with Marcaine with epinephrine.  There was good hemostasis and good approximation of all the tissue at the conclusion.         KARYN ANTHONY MD             D: 2021   T: 2021   MT: LANNY      Name:     HANNAH GRAY   MRN:      [id number]        Account:        [id number]   :      1955           Procedure Date: 2021      Document: [id number]

## 2021-03-30 LAB — COPATH REPORT: NORMAL

## 2021-04-12 ENCOUNTER — VIRTUAL VISIT (OUTPATIENT)
Dept: ONCOLOGY | Facility: CLINIC | Age: 66
End: 2021-04-12
Attending: OBSTETRICS & GYNECOLOGY
Payer: COMMERCIAL

## 2021-04-12 DIAGNOSIS — D07.1 VIN III (VULVAR INTRAEPITHELIAL NEOPLASIA III): Primary | ICD-10-CM

## 2021-04-12 PROCEDURE — 999N001193 HC VIDEO/TELEPHONE VISIT; NO CHARGE

## 2021-04-12 PROCEDURE — 99215 OFFICE O/P EST HI 40 MIN: CPT | Mod: TEL | Performed by: OBSTETRICS & GYNECOLOGY

## 2021-04-12 NOTE — LETTER
"    2021         RE: Carly Diaz  [address]  [facility] [address]        Dear Colleague,    Thank you for referring your patient, Carly Diaz, to the [facility]. Please see a copy of my visit note below.    Carly is a 65 year old who is being evaluated via a billable telephone visit.      What phone number would you like to be contacted at? [phone]  How would you like to obtain your AVS? Padmini     Vitals - Patient Reported  Weight (Patient Reported): 59 kg (130 lb)  Height (Patient Reported): 157.5 cm (5' 2\")  BMI (Based on Pt Reported Ht/Wt): 23.78  Pain Score: No Pain (0)    Alise Hughes CMA 2021  11:38 AM     Phone call duration: 10 minutes      Gynecologic Oncology Follow Up Note    In light of the recent COVID19 pandemic, and in accordance with our group and national guidelines this patients care has been reviewed and it is felt that presenting for her visit would be more likely to increase rather than decrease her relative risks.  Therefore this visit was conducted by phone.        RE: Carly Diaz  : 1955  STEFANIA: 3/22/2021     CC: BRANDY II and BRANDY III    HPI:  Carly Diaz is a 65 year old woman with a history of BRANDY II and BRANDY III.  She is s/p WLE 19 and ablation of right and left vulvar lesions using cautery 6/3/2020.  She is here today for a surveillance visit.     Oncology History:  Noted vulvar skin is raw, painful and itchy with fissures. Had tried using clobetasol x2 weeks without any relief. On HRT (progesterone, estrogen, testosterone).    Vulvar symptoms of have been present on and off since . Spontaneously resolved; however, had recurrence of symptoms in . 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    Per Dr Ornelas's exam: \"The skin of the perineum extending to about 1/2 way up the labia minor is raised, raw and brightly erythematous. Small fissures are present in the tissue. " "No scaly white tissue paper appearing skin is present. These changes do not extend down to the rectum or up toward the clitoral oneil. Vagina is without discharge or lesions. Vaginal tissue appears normal.\"    3/27/19 Right Perineum Biopsy  DIAGNOSIS:   Right perineum, biopsy:   1. High grade squamous intraepithelial lesion (BRANDY II-III).   2. Benign epidermal inclusion cyst.   3. No HSV inclusions identified on immunostain.     5/21/19: Wide local excision of the vulva:  A. Vulva, wide excision:    High-grade squamous epithelial dysplasia (EVON 3, high-grade SAIRA), positive margin at 9:00.-12:00 including the 12:00 tip    Her surgery was delayed owing to COVID pandemic.       6/3/2020: Examination under anesthesia, biopsy of right vulva, ablation of right and left vulvar lesions using cautery.   FINAL DIAGNOSIS:   Vulva, right mid labium, biopsy:   -Low grade squamous intraepithelial lesion (vulvar intraepithelial   neoplasia 1)     3/6/21 colposcopy and biopsies:    Vulva, right, punch biopsy:   - High grade squamous intraepithelial lesion (vulvar intraepithelial   neoplasia 3, BRANDY 3)   - BRANDY 3 is present at one edge of the biopsy material     She underwent excision and ablation on 3/26/21    PATH:  FINAL DIAGNOSIS:   A. RIGHT VULVA MEDIAL MARGIN 5 O'CLOCK:   - Vulvar tissue with no significant histologic abnormality     B. RIGHT VULVA MEDIAL MARGIN 1 O'CLOCK:   - Vulvar tissue with no significant histologic abnormality     C. RIGHT VULVA, EXCISION:   - Vulvar tissue with reactive/regenerative changes   - Negative for dysplasia     D. RIGHT LABIA MINORA, EXCISION:   - High grade squamous intraepithelial lesion (vulvar intraepithelial   neoplasia 3)   - Margins negative for dysplasia   - Focal epithelial erosion        She presents by phone for post-operative discussion      Health Maintenance  FIT- 2/10/2020, negative repeat in 3 years  Mammogram-1/19/2021  Annual physical-1/12/2021        Review of Systems "     Constitutional:  Negative for fever, chills, weight loss, weight gain, fatigue, decreased appetite, night sweats, recent stressors, height gain, height loss, post-operative complications, incisional pain, hallucinations, increased energy, hyperactivity and confused.   HENT:  Negative for ear pain, hearing loss, tinnitus, nosebleeds, trouble swallowing, hoarse voice, mouth sores, sore throat, ear discharge, tooth pain, gum tenderness, taste disturbance, smell disturbance, hearing aid, bleeding gums, dry mouth, sinus pain, sinus congestion and neck mass.    Eyes:  Negative for double vision, pain, redness, eye pain, decreased vision, eye watering, eye bulging, eye dryness, flashing lights, spots, floaters, strabismus, tunnel vision, jaundice and eye irritation.   Respiratory:   Negative for cough, hemoptysis, sputum production, shortness of breath, wheezing, sleep disturbances due to breathing, snores loudly, respiratory pain, dyspnea on exertion, cough disturbing sleep and postural dyspnea.    Cardiovascular:  Negative for chest pain, dyspnea on exertion, palpitations, orthopnea, claudication, leg swelling, fingers/toes turn blue, hypertension, hypotension, syncope, history of heart murmur, chest pain on exertion, chest pain at rest, pacemaker, few scattered varicosities, leg pain, sleep disturbances due to breathing, tachycardia, light-headedness, exercise intolerance and edema.   Gastrointestinal:  Negative for heartburn, nausea, vomiting, abdominal pain, diarrhea, constipation, blood in stool, melena, rectal pain, bloating, hemorrhoids, bowel incontinence, jaundice, rectal bleeding, coffee ground emesis and change in stool.   Genitourinary:  Negative for bladder incontinence, dysuria, urgency, hematuria, flank pain, vaginal discharge, difficulty urinating, genital sores, dyspareunia, decreased libido, nocturia, voiding less frequently, arousal difficulty, abnormal vaginal bleeding, excessive menstruation,  menstrual changes, hot flashes, vaginal dryness and postmenopausal bleeding.   Musculoskeletal:  Negative for myalgias, back pain, joint swelling, arthralgias, stiffness, muscle cramps, neck pain, bone pain, muscle weakness and fracture.   Skin:  Positive for hair changes.   Neurological:  Negative for dizziness, tingling, tremors, speech change, seizures, loss of consciousness, weakness, light-headedness, numbness, headaches, disturbances in coordination, extremity numbness, memory loss, difficulty walking and paralysis.   Endo/Heme:  Negative for anemia, swollen glands and bruises/bleeds easily.   Psychiatric/Behavioral:  Negative for depression, hallucinations, memory loss, decreased concentration, mood swings and panic attacks.    Breast:  Negative for breast discharge, breast mass, breast pain and nipple retraction.   Endocrine:  Negative for altered temperature regulation, polyphagia and polydipsia.        Past Medical History:    Past Medical History:   Diagnosis Date     Arthritis      PONV (postoperative nausea and vomiting)          Past Surgical History:    Past Surgical History:   Procedure Laterality Date     EXCISE VULVA WIDE LOCAL Right 6/3/2020    Procedure: Exam Under Anesthesia, ablation and partial excision of vulva, biopsy of vulva;  Surgeon: José Miguel Anthony MD;  Location:  OR     EXCISE VULVA WIDE LOCAL Right 3/26/2021    Procedure: wide local excision of vulva;  Surgeon: José Miguel Anthony MD;  Location:  OR     ORTHOPEDIC SURGERY      right knee replacement 2015         Health Maintenance Due   Topic Date Due     PREVENTIVE CARE VISIT  Never done     ADVANCE CARE PLANNING  Never done     HIV SCREENING  Never done     COVID-19 Vaccine (1) Never done     HEPATITIS C SCREENING  Never done     PAP  Never done     LIPID  Never done     ZOSTER IMMUNIZATION (2 of 2) 03/31/2020     INFLUENZA VACCINE (1) Never done     PHQ-2  Never done     COLORECTAL CANCER SCREENING  02/12/2021        Current Medications:     Current Outpatient Medications   Medication Sig Dispense Refill     buPROPion (WELLBUTRIN XL) 300 MG 24 hr tablet every morning        imiquimod (ALDARA) 5 % external cream Apply topically three times a week 30 packet 0     levothyroxine (SYNTHROID/LEVOTHROID) 112 MCG tablet daily        lidocaine (XYLOCAINE) 2 % external gel Apply topically every 8 hours as needed for moderate pain 30 mL 0     liothyronine (CYTOMEL) 5 MCG tablet 15 mcg daily        magnesium 200 MG TABS daily        oxyCODONE (ROXICODONE) 5 MG tablet Take 1-2 tablets (5-10 mg) by mouth every 4 hours as needed for moderate to severe pain 6 tablet 0     Progesterone Micronized (PROGESTERONE PO) Take 200 mg by mouth daily lozenge       senna-docusate (SENOKOT-S/PERICOLACE) 8.6-50 MG tablet Take 1-2 tablets by mouth 2 times daily 30 tablet 0     sertraline (ZOLOFT) 100 MG tablet Take 100 mg by mouth At Bedtime        TESTOSTERONE 2MG Takes 2.5 mg three times per week ( Mon, Wed, Fri)       traZODone (DESYREL) 25 mg TABS half-tab 50 mg At Bedtime Takes 1/2 tab (25 mg)       UNABLE TO FIND MEDICATION NAME: estrogen cream daily       bacitracin 500 UNIT/GM external ointment Apply topically 2 times daily To vulvar area       Cholecalciferol (VITAMIN D3) 50 MCG (2000 UT) TABS 2,000 Int'l Units       vitamin B-12 (CYANOCOBALAMIN) 250 MCG tablet            Allergies:        Allergies   Allergen Reactions     Penicillins Hives        Social History:     Social History     Tobacco Use     Smoking status: Never Smoker     Smokeless tobacco: Never Used   Substance Use Topics     Alcohol use: Yes     Comment: socailly       History   Drug Use Unknown         Family History:     The patient's family history is notable for:    No family history on file.      Physical Exam:     No examination - phone visit - but the patient did send photographs    Assessment:    Carly Diaz is a 65 year old woman  with a history of BRANDY II and BRANDY  III.  She is s/p WLE 5/21/19 and ablation of right and left vulvar lesions using cautery 6/3/2020.  She is here today for a surveillance visit.        63 minutes spent on the date of the encounter doing chart review, history and exam, documentation, and further activities as noted above.      Plan:     1.) BRANDY - we have discussed the pathology and I have recommended routine follow up only at this point.  She has recevered from similar surgeries already and is familiar with the wound care protocols.  2.) Health maintenance:  Issues addressed today include following up with PCP for annual health maintenance and non-gynecologic issues.       José Miguel Anthony MD    Division of Gynecologic Oncology    CC  Patient Care Team:  Rachel Umana as PCP - General (Family Practice)  Lydia Bruce, DEIDRE as Case Management Specialist (Gynecologic Oncology)

## 2021-04-12 NOTE — PROGRESS NOTES
"Carly is a 65 year old who is being evaluated via a billable telephone visit.      What phone number would you like to be contacted at? [phone]  How would you like to obtain your AVS? Padmini     Vitals - Patient Reported  Weight (Patient Reported): 59 kg (130 lb)  Height (Patient Reported): 157.5 cm (5' 2\")  BMI (Based on Pt Reported Ht/Wt): 23.78  Pain Score: No Pain (0)    Alise Hughes CMA 2021  11:38 AM     Phone call duration: 10 minutes      Gynecologic Oncology Follow Up Note    In light of the recent COVID19 pandemic, and in accordance with our group and national guidelines this patients care has been reviewed and it is felt that presenting for her visit would be more likely to increase rather than decrease her relative risks.  Therefore this visit was conducted by phone.        RE: Carly Diaz  : 1955  STEFANIA: 3/22/2021     CC: BRANDY II and BRANDY III    HPI:  Carly Diaz is a 65 year old woman with a history of BRANDY II and BRANDY III.  She is s/p WLE 19 and ablation of right and left vulvar lesions using cautery 6/3/2020.  She is here today for a surveillance visit.     Oncology History:  Noted vulvar skin is raw, painful and itchy with fissures. Had tried using clobetasol x2 weeks without any relief. On HRT (progesterone, estrogen, testosterone).    Vulvar symptoms of have been present on and off since . Spontaneously resolved; however, had recurrence of symptoms in . 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    Per Dr Ornelas's exam: \"The skin of the perineum extending to about 1/2 way up the labia minor is raised, raw and brightly erythematous. Small fissures are present in the tissue. No scaly white tissue paper appearing skin is present. These changes do not extend down to the rectum or up toward the clitoral oneil. Vagina is without discharge or lesions. Vaginal tissue appears normal.\"    3/27/19 Right Perineum Biopsy  DIAGNOSIS:   Right " perineum, biopsy:   1. High grade squamous intraepithelial lesion (BRANDY II-III).   2. Benign epidermal inclusion cyst.   3. No HSV inclusions identified on immunostain.     5/21/19: Wide local excision of the vulva:  A. Vulva, wide excision:    High-grade squamous epithelial dysplasia (EVON 3, high-grade SAIRA), positive margin at 9:00.-12:00 including the 12:00 tip    Her surgery was delayed owing to COVID pandemic.       6/3/2020: Examination under anesthesia, biopsy of right vulva, ablation of right and left vulvar lesions using cautery.   FINAL DIAGNOSIS:   Vulva, right mid labium, biopsy:   -Low grade squamous intraepithelial lesion (vulvar intraepithelial   neoplasia 1)     3/6/21 colposcopy and biopsies:    Vulva, right, punch biopsy:   - High grade squamous intraepithelial lesion (vulvar intraepithelial   neoplasia 3, BRANDY 3)   - BRANDY 3 is present at one edge of the biopsy material     She underwent excision and ablation on 3/26/21    PATH:  FINAL DIAGNOSIS:   A. RIGHT VULVA MEDIAL MARGIN 5 O'CLOCK:   - Vulvar tissue with no significant histologic abnormality     B. RIGHT VULVA MEDIAL MARGIN 1 O'CLOCK:   - Vulvar tissue with no significant histologic abnormality     C. RIGHT VULVA, EXCISION:   - Vulvar tissue with reactive/regenerative changes   - Negative for dysplasia     D. RIGHT LABIA MINORA, EXCISION:   - High grade squamous intraepithelial lesion (vulvar intraepithelial   neoplasia 3)   - Margins negative for dysplasia   - Focal epithelial erosion        She presents by phone for post-operative discussion      Health Maintenance  FIT- 2/10/2020, negative repeat in 3 years  Mammogram-1/19/2021  Annual physical-1/12/2021        Review of Systems     Constitutional:  Negative for fever, chills, weight loss, weight gain, fatigue, decreased appetite, night sweats, recent stressors, height gain, height loss, post-operative complications, incisional pain, hallucinations, increased energy, hyperactivity and  confused.   HENT:  Negative for ear pain, hearing loss, tinnitus, nosebleeds, trouble swallowing, hoarse voice, mouth sores, sore throat, ear discharge, tooth pain, gum tenderness, taste disturbance, smell disturbance, hearing aid, bleeding gums, dry mouth, sinus pain, sinus congestion and neck mass.    Eyes:  Negative for double vision, pain, redness, eye pain, decreased vision, eye watering, eye bulging, eye dryness, flashing lights, spots, floaters, strabismus, tunnel vision, jaundice and eye irritation.   Respiratory:   Negative for cough, hemoptysis, sputum production, shortness of breath, wheezing, sleep disturbances due to breathing, snores loudly, respiratory pain, dyspnea on exertion, cough disturbing sleep and postural dyspnea.    Cardiovascular:  Negative for chest pain, dyspnea on exertion, palpitations, orthopnea, claudication, leg swelling, fingers/toes turn blue, hypertension, hypotension, syncope, history of heart murmur, chest pain on exertion, chest pain at rest, pacemaker, few scattered varicosities, leg pain, sleep disturbances due to breathing, tachycardia, light-headedness, exercise intolerance and edema.   Gastrointestinal:  Negative for heartburn, nausea, vomiting, abdominal pain, diarrhea, constipation, blood in stool, melena, rectal pain, bloating, hemorrhoids, bowel incontinence, jaundice, rectal bleeding, coffee ground emesis and change in stool.   Genitourinary:  Negative for bladder incontinence, dysuria, urgency, hematuria, flank pain, vaginal discharge, difficulty urinating, genital sores, dyspareunia, decreased libido, nocturia, voiding less frequently, arousal difficulty, abnormal vaginal bleeding, excessive menstruation, menstrual changes, hot flashes, vaginal dryness and postmenopausal bleeding.   Musculoskeletal:  Negative for myalgias, back pain, joint swelling, arthralgias, stiffness, muscle cramps, neck pain, bone pain, muscle weakness and fracture.   Skin:  Positive for hair  changes.   Neurological:  Negative for dizziness, tingling, tremors, speech change, seizures, loss of consciousness, weakness, light-headedness, numbness, headaches, disturbances in coordination, extremity numbness, memory loss, difficulty walking and paralysis.   Endo/Heme:  Negative for anemia, swollen glands and bruises/bleeds easily.   Psychiatric/Behavioral:  Negative for depression, hallucinations, memory loss, decreased concentration, mood swings and panic attacks.    Breast:  Negative for breast discharge, breast mass, breast pain and nipple retraction.   Endocrine:  Negative for altered temperature regulation, polyphagia and polydipsia.        Past Medical History:    Past Medical History:   Diagnosis Date     Arthritis      PONV (postoperative nausea and vomiting)          Past Surgical History:    Past Surgical History:   Procedure Laterality Date     EXCISE VULVA WIDE LOCAL Right 6/3/2020    Procedure: Exam Under Anesthesia, ablation and partial excision of vulva, biopsy of vulva;  Surgeon: José Miguel Anthony MD;  Location: [facility] OR     EXCISE VULVA WIDE LOCAL Right 3/26/2021    Procedure: wide local excision of vulva;  Surgeon: José Miguel Anthony MD;  Location:  OR     ORTHOPEDIC SURGERY      right knee replacement 2015         Health Maintenance Due   Topic Date Due     PREVENTIVE CARE VISIT  Never done     ADVANCE CARE PLANNING  Never done     HIV SCREENING  Never done     COVID-19 Vaccine (1) Never done     HEPATITIS C SCREENING  Never done     PAP  Never done     LIPID  Never done     ZOSTER IMMUNIZATION (2 of 2) 03/31/2020     INFLUENZA VACCINE (1) Never done     PHQ-2  Never done     COLORECTAL CANCER SCREENING  02/12/2021       Current Medications:     Current Outpatient Medications   Medication Sig Dispense Refill     buPROPion (WELLBUTRIN XL) 300 MG 24 hr tablet every morning        imiquimod (ALDARA) 5 % external cream Apply topically three times a week 30 packet 0      levothyroxine (SYNTHROID/LEVOTHROID) 112 MCG tablet daily        lidocaine (XYLOCAINE) 2 % external gel Apply topically every 8 hours as needed for moderate pain 30 mL 0     liothyronine (CYTOMEL) 5 MCG tablet 15 mcg daily        magnesium 200 MG TABS daily        oxyCODONE (ROXICODONE) 5 MG tablet Take 1-2 tablets (5-10 mg) by mouth every 4 hours as needed for moderate to severe pain 6 tablet 0     Progesterone Micronized (PROGESTERONE PO) Take 200 mg by mouth daily lozenge       senna-docusate (SENOKOT-S/PERICOLACE) 8.6-50 MG tablet Take 1-2 tablets by mouth 2 times daily 30 tablet 0     sertraline (ZOLOFT) 100 MG tablet Take 100 mg by mouth At Bedtime        TESTOSTERONE 2MG Takes 2.5 mg three times per week ( Mon, Wed, Fri)       traZODone (DESYREL) 25 mg TABS half-tab 50 mg At Bedtime Takes 1/2 tab (25 mg)       UNABLE TO FIND MEDICATION NAME: estrogen cream daily       bacitracin 500 UNIT/GM external ointment Apply topically 2 times daily To vulvar area       Cholecalciferol (VITAMIN D3) 50 MCG (2000 UT) TABS 2,000 Int'l Units       vitamin B-12 (CYANOCOBALAMIN) 250 MCG tablet            Allergies:        Allergies   Allergen Reactions     Penicillins Hives        Social History:     Social History     Tobacco Use     Smoking status: Never Smoker     Smokeless tobacco: Never Used   Substance Use Topics     Alcohol use: Yes     Comment: socailly       History   Drug Use Unknown         Family History:     The patient's family history is notable for:    No family history on file.      Physical Exam:     No examination - phone visit - but the patient did send photographs    Assessment:    Carly Diaz is a 65 year old woman  with a history of BRANDY II and BRANDY III.  She is s/p WLE 5/21/19 and ablation of right and left vulvar lesions using cautery 6/3/2020.  She is here today for a surveillance visit.        63 minutes spent on the date of the encounter doing chart review, history and exam, documentation, and  further activities as noted above.      Plan:     1.) BRANDY - we have discussed the pathology and I have recommended routine follow up only at this point.  She has recevered from similar surgeries already and is familiar with the wound care protocols.  2.) Health maintenance:  Issues addressed today include following up with PCP for annual health maintenance and non-gynecologic issues.       Karyn Kirkland MD    Division of Gynecologic Oncology      CC  Patient Care Team:  Rachel Umana as PCP - General (Family Practice)  Lydia Bruce, RN as Case Management Specialist (Gynecologic Oncology)  Karyn Kirkland MD as Assigned Cancer Care Provider  KARYN KIRKLAND

## 2021-04-25 ENCOUNTER — HEALTH MAINTENANCE LETTER (OUTPATIENT)
Age: 66
End: 2021-04-25

## 2021-05-16 NOTE — ANESTHESIA POSTPROCEDURE EVALUATION
Anesthesia POST Procedure Evaluation    Patient: Carly Diaz   MRN:     [id number] Gender:   female   Age:    64 year old :      1955        Preoperative Diagnosis: BRANDY III (vulvar intraepithelial neoplasia III) [D07.1]   Procedure(s):  EUA, ablation and partial excision of vulva, biopsy of vulva   Postop Comments: No value filed.     Anesthesia Type: General       Disposition: Outpatient   Postop Pain Control: Uneventful            Sign Out: Well controlled pain   PONV: No   Neuro/Psych: Uneventful            Sign Out: Acceptable/Baseline neuro status   Airway/Respiratory: Uneventful            Sign Out: Acceptable/Baseline resp. status   CV/Hemodynamics: Uneventful            Sign Out: Acceptable CV status   Other NRE: NONE   DID A NON-ROUTINE EVENT OCCUR? No         Last Anesthesia Record Vitals:  CRNA VITALS  6/3/2020 0751 - 6/3/2020 0851      6/3/2020             Pulse:  69    SpO2:  100 %      CRNA VITALS  6/3/2020 0751 - 6/3/2020 0851      6/3/2020             Pulse:  69    SpO2:  100 %          Last PACU Vitals:  Vitals Value Taken Time   /71 6/3/2020  9:00 AM   Temp 36.4  C (97.6  F) 6/3/2020  8:30 AM   Pulse 69 6/3/2020  9:00 AM   Resp 12 6/3/2020  8:30 AM   SpO2 98 % 6/3/2020  9:01 AM   Temp src     NIBP     Pulse     SpO2     Resp     Temp     Ht Rate     Temp 2     Vitals shown include unvalidated device data.      Electronically Signed By: Khari Rodriguez MD, Brina 3, 2020, 9:02 AM   No family history pertinent to patient’s current condition/encounter

## 2021-10-10 ENCOUNTER — HEALTH MAINTENANCE LETTER (OUTPATIENT)
Age: 66
End: 2021-10-10

## 2022-05-15 NOTE — PROGRESS NOTES
"  Gynecologic Oncology Follow Up Note        RE: Carly Diaz  : 1955  STEFANIA: 2022   CC: BRANDY II and BRANDY III    HPI:  Carly Diaz is a 66 year old woman with a history of BRANDY II and BRANDY III.  She is s/p WLE 19 and ablation of right and left vulvar lesions using cautery 6/3/2020.  She is here today for a surveillance visit.     Oncology History:  Noted vulvar skin is raw, painful and itchy with fissures. Had tried using clobetasol x2 weeks without any relief. On HRT (progesterone, estrogen, testosterone).    Vulvar symptoms of have been present on and off since . Spontaneously resolved; however, had recurrence of symptoms in . 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    Per Dr Ornelas's exam: \"The skin of the perineum extending to about 1/2 way up the labia minor is raised, raw and brightly erythematous. Small fissures are present in the tissue. No scaly white tissue paper appearing skin is present. These changes do not extend down to the rectum or up toward the clitoral oneil. Vagina is without discharge or lesions. Vaginal tissue appears normal.\"    3/27/19 Right Perineum Biopsy  DIAGNOSIS:   Right perineum, biopsy:   1. High grade squamous intraepithelial lesion (BRANDY II-III).   2. Benign epidermal inclusion cyst.   3. No HSV inclusions identified on immunostain.     19: Wide local excision of the vulva:  A. Vulva, wide excision:    High-grade squamous epithelial dysplasia (EVON 3, high-grade SAIRA), positive margin at 9:00.-12:00 including the 12:00 tip    Her surgery was delayed owing to COVID pandemic.       6/3/2020: Examination under anesthesia, biopsy of right vulva, ablation of right and left vulvar lesions using cautery.   FINAL DIAGNOSIS:   Vulva, right mid labium, biopsy:   -Low grade squamous intraepithelial lesion (vulvar intraepithelial   neoplasia 1)     3/6/21 colposcopy and biopsies:    Vulva, right, punch biopsy:   - High grade squamous " intraepithelial lesion (vulvar intraepithelial   neoplasia 3, BRANDY 3)   - BRANDY 3 is present at one edge of the biopsy material     She underwent excision and ablation on 3/26/21    PATH:  FINAL DIAGNOSIS:   A. RIGHT VULVA MEDIAL MARGIN 5 O'CLOCK:   - Vulvar tissue with no significant histologic abnormality     B. RIGHT VULVA MEDIAL MARGIN 1 O'CLOCK:   - Vulvar tissue with no significant histologic abnormality     C. RIGHT VULVA, EXCISION:   - Vulvar tissue with reactive/regenerative changes   - Negative for dysplasia     D. RIGHT LABIA MINORA, EXCISION:   - High grade squamous intraepithelial lesion (vulvar intraepithelial   neoplasia 3)   - Margins negative for dysplasia   - Focal epithelial erosion        She presents by phone for post-operative discussion      Health Maintenance  FIT- 2/10/2020, negative repeat in 3 years  Mammogram-1/19/2021  Annual physical-1/12/2021        Review of Systems     Constitutional:  Negative for fever, chills, weight loss, weight gain, fatigue, decreased appetite, night sweats, recent stressors, height gain, height loss, post-operative complications, incisional pain, hallucinations, increased energy, hyperactivity and confused.   HENT:  Negative for ear pain, hearing loss, tinnitus, nosebleeds, trouble swallowing, hoarse voice, mouth sores, sore throat, ear discharge, tooth pain, gum tenderness, taste disturbance, smell disturbance, hearing aid, bleeding gums, dry mouth, sinus pain, sinus congestion and neck mass.    Eyes:  Negative for double vision, pain, redness, eye pain, decreased vision, eye watering, eye bulging, eye dryness, flashing lights, spots, floaters, strabismus, tunnel vision, jaundice and eye irritation.   Respiratory:   Negative for cough, hemoptysis, sputum production, shortness of breath, wheezing, sleep disturbances due to breathing, snores loudly, respiratory pain, dyspnea on exertion, cough disturbing sleep and postural dyspnea.    Cardiovascular:  Negative for  chest pain, dyspnea on exertion, palpitations, orthopnea, claudication, leg swelling, fingers/toes turn blue, hypertension, hypotension, syncope, history of heart murmur, chest pain on exertion, chest pain at rest, pacemaker, few scattered varicosities, leg pain, sleep disturbances due to breathing, tachycardia, light-headedness, exercise intolerance and edema.   Gastrointestinal:  Negative for heartburn, nausea, vomiting, abdominal pain, diarrhea, constipation, blood in stool, melena, rectal pain, bloating, hemorrhoids, bowel incontinence, jaundice, rectal bleeding, coffee ground emesis and change in stool.   Genitourinary:  Negative for bladder incontinence, dysuria, urgency, hematuria, flank pain, vaginal discharge, difficulty urinating, genital sores, dyspareunia, decreased libido, nocturia, voiding less frequently, arousal difficulty, abnormal vaginal bleeding, excessive menstruation, menstrual changes, hot flashes, vaginal dryness and postmenopausal bleeding.   Musculoskeletal:  Negative for myalgias, back pain, joint swelling, arthralgias, stiffness, muscle cramps, neck pain, bone pain, muscle weakness and fracture.   Skin:  Positive for hair changes.   Neurological:  Negative for dizziness, tingling, tremors, speech change, seizures, loss of consciousness, weakness, light-headedness, numbness, headaches, disturbances in coordination, extremity numbness, memory loss, difficulty walking and paralysis.   Endo/Heme:  Negative for anemia, swollen glands and bruises/bleeds easily.   Psychiatric/Behavioral:  Negative for depression, hallucinations, memory loss, decreased concentration, mood swings and panic attacks.    Breast:  Negative for breast discharge, breast mass, breast pain and nipple retraction.   Endocrine:  Negative for altered temperature regulation, polyphagia and polydipsia.        Past Medical History:    Past Medical History:   Diagnosis Date     Arthritis      PONV (postoperative nausea and  vomiting)          Past Surgical History:    Past Surgical History:   Procedure Laterality Date     EXCISE VULVA WIDE LOCAL Right 6/3/2020    Procedure: Exam Under Anesthesia, ablation and partial excision of vulva, biopsy of vulva;  Surgeon: José Miguel Anthony MD;  Location: [facility] OR     EXCISE VULVA WIDE LOCAL Right 3/26/2021    Procedure: wide local excision of vulva;  Surgeon: José Miguel Anthony MD;  Location: [facility] OR     ORTHOPEDIC SURGERY      right knee replacement 2015         Health Maintenance Due   Topic Date Due     DEXA  Never done     ADVANCE CARE PLANNING  Never done     HIV SCREENING  Never done     HEPATITIS C SCREENING  Never done     LIPID  Never done     COLORECTAL CANCER SCREENING  02/12/2021     MEDICARE ANNUAL WELLNESS VISIT  Never done     FALL RISK ASSESSMENT  Never done     Pneumococcal Vaccine: 65+ Years (1 - PCV) Never done     PHQ-2 (once per calendar year)  Never done       Current Medications:     Current Outpatient Medications   Medication Sig Dispense Refill     bacitracin 500 UNIT/GM external ointment Apply topically 2 times daily To vulvar area       buPROPion (WELLBUTRIN XL) 300 MG 24 hr tablet every morning        Cholecalciferol (VITAMIN D3) 50 MCG (2000 UT) TABS 2,000 Int'l Units       imiquimod (ALDARA) 5 % external cream Apply topically three times a week 30 packet 0     levothyroxine (SYNTHROID/LEVOTHROID) 112 MCG tablet daily        lidocaine (XYLOCAINE) 2 % external gel Apply topically every 8 hours as needed for moderate pain 30 mL 0     liothyronine (CYTOMEL) 5 MCG tablet 15 mcg daily        magnesium 200 MG TABS daily        oxyCODONE (ROXICODONE) 5 MG tablet Take 1-2 tablets (5-10 mg) by mouth every 4 hours as needed for moderate to severe pain 6 tablet 0     Progesterone Micronized (PROGESTERONE PO) Take 200 mg by mouth daily lozenge       senna-docusate (SENOKOT-S/PERICOLACE) 8.6-50 MG tablet Take 1-2 tablets by mouth 2 times daily 30 tablet 0     sertraline  (ZOLOFT) 100 MG tablet Take 100 mg by mouth At Bedtime        TESTOSTERONE 2MG Takes 2.5 mg three times per week ( Mon, Wed, Fri)       traZODone (DESYREL) 25 mg TABS half-tab 50 mg At Bedtime Takes 1/2 tab (25 mg)       UNABLE TO FIND MEDICATION NAME: estrogen cream daily       vitamin B-12 (CYANOCOBALAMIN) 250 MCG tablet            Allergies:        Allergies   Allergen Reactions     Penicillins Hives        Social History:     Social History     Tobacco Use     Smoking status: Never Smoker     Smokeless tobacco: Never Used   Substance Use Topics     Alcohol use: Yes     Comment: socailly       History   Drug Use Unknown         Family History:     The patient's family history is notable for:    No family history on file.      Physical Exam:     General - WA/NAD  VS: /86   Pulse 83   Temp 98.2  F (36.8  C) (Oral)   Resp 16   Wt 57.4 kg (126 lb 9.6 oz)   LMP  (LMP Unknown)   SpO2 98%   BMI 23.16 kg/m    PS: 0    HEART: RRR  LUNGS CTA  ABD - soft NY/ND  LE: no sig edema    Pelvic: grossly normal post-flap exam.  AFter the application of acetic acid there are no additional abnormalities. No excoriations or masses.      Assessment:    Carly Diaz is a 65 year old woman  with a history of BRANDY II and BRANDY III.  She is s/p WLE 5/21/19 and ablation of right and left vulvar lesions using cautery 6/3/2020.  She is here today for a surveillance visit.        63 minutes spent on the date of the encounter doing chart review, history and exam, documentation, and further activities as noted above.      Plan:     1.) BRANDY - AUGUST on clinical examination - her flap has healed and softened     2.) Health maintenance:  Issues addressed today include following up with PCP for annual health maintenance and non-gynecologic issues.       José Miguel Anthony MD    Division of Gynecologic Oncology      CC  Patient Care Team:  Rachel Umana as PCP - General (Family Practice)  José Miguel Anthony MD as Assigned  Cancer Care Provider  KARYN KIRKLAND

## 2022-05-16 ENCOUNTER — ONCOLOGY VISIT (OUTPATIENT)
Dept: ONCOLOGY | Facility: CLINIC | Age: 67
End: 2022-05-16
Attending: OBSTETRICS & GYNECOLOGY
Payer: COMMERCIAL

## 2022-05-16 VITALS
WEIGHT: 126.6 LBS | OXYGEN SATURATION: 98 % | BODY MASS INDEX: 23.16 KG/M2 | DIASTOLIC BLOOD PRESSURE: 86 MMHG | HEART RATE: 83 BPM | RESPIRATION RATE: 16 BRPM | SYSTOLIC BLOOD PRESSURE: 139 MMHG | TEMPERATURE: 98.2 F

## 2022-05-16 DIAGNOSIS — D07.1 VIN III (VULVAR INTRAEPITHELIAL NEOPLASIA III): Primary | ICD-10-CM

## 2022-05-16 PROCEDURE — G0463 HOSPITAL OUTPT CLINIC VISIT: HCPCS

## 2022-05-16 PROCEDURE — 99215 OFFICE O/P EST HI 40 MIN: CPT | Performed by: OBSTETRICS & GYNECOLOGY

## 2022-05-16 ASSESSMENT — PAIN SCALES - GENERAL: PAINLEVEL: NO PAIN (0)

## 2022-05-16 NOTE — LETTER
"    2022         RE: Carly Diaz  [address]  [facility] [address]        Dear Colleague,    Thank you for referring your patient, Carly Diaz, to the [facility]. Please see a copy of my visit note below.      Gynecologic Oncology Follow Up Note        RE: Carly Diaz  : 1955  STEFANIA: 2022   CC: BRANDY II and BRANDY III    HPI:  Carly Diaz is a 66 year old woman with a history of BRANDY II and BRANDY III.  She is s/p WLE 19 and ablation of right and left vulvar lesions using cautery 6/3/2020.  She is here today for a surveillance visit.     Oncology History:  Noted vulvar skin is raw, painful and itchy with fissures. Had tried using clobetasol x2 weeks without any relief. On HRT (progesterone, estrogen, testosterone).    Vulvar symptoms of have been present on and off since . Spontaneously resolved; however, had recurrence of symptoms in . 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    Per Dr Ornelas's exam: \"The skin of the perineum extending to about 1/2 way up the labia minor is raised, raw and brightly erythematous. Small fissures are present in the tissue. No scaly white tissue paper appearing skin is present. These changes do not extend down to the rectum or up toward the clitoral oneil. Vagina is without discharge or lesions. Vaginal tissue appears normal.\"    3/27/19 Right Perineum Biopsy  DIAGNOSIS:   Right perineum, biopsy:   1. High grade squamous intraepithelial lesion (BRANDY II-III).   2. Benign epidermal inclusion cyst.   3. No HSV inclusions identified on immunostain.     19: Wide local excision of the vulva:  A. Vulva, wide excision:    High-grade squamous epithelial dysplasia (EVON 3, high-grade SAIRA), positive margin at 9:00.-12:00 including the 12:00 tip    Her surgery was delayed owing to COVID pandemic.       6/3/2020: Examination under anesthesia, biopsy of right vulva, ablation of right and left vulvar " lesions using cautery.   FINAL DIAGNOSIS:   Vulva, right mid labium, biopsy:   -Low grade squamous intraepithelial lesion (vulvar intraepithelial   neoplasia 1)     3/6/21 colposcopy and biopsies:    Vulva, right, punch biopsy:   - High grade squamous intraepithelial lesion (vulvar intraepithelial   neoplasia 3, BRANDY 3)   - BRANDY 3 is present at one edge of the biopsy material     She underwent excision and ablation on 3/26/21    PATH:  FINAL DIAGNOSIS:   A. RIGHT VULVA MEDIAL MARGIN 5 O'CLOCK:   - Vulvar tissue with no significant histologic abnormality     B. RIGHT VULVA MEDIAL MARGIN 1 O'CLOCK:   - Vulvar tissue with no significant histologic abnormality     C. RIGHT VULVA, EXCISION:   - Vulvar tissue with reactive/regenerative changes   - Negative for dysplasia     D. RIGHT LABIA MINORA, EXCISION:   - High grade squamous intraepithelial lesion (vulvar intraepithelial   neoplasia 3)   - Margins negative for dysplasia   - Focal epithelial erosion        She presents by phone for post-operative discussion      Health Maintenance  FIT- 2/10/2020, negative repeat in 3 years  Mammogram-1/19/2021  Annual physical-1/12/2021        Review of Systems     Constitutional:  Negative for fever, chills, weight loss, weight gain, fatigue, decreased appetite, night sweats, recent stressors, height gain, height loss, post-operative complications, incisional pain, hallucinations, increased energy, hyperactivity and confused.   HENT:  Negative for ear pain, hearing loss, tinnitus, nosebleeds, trouble swallowing, hoarse voice, mouth sores, sore throat, ear discharge, tooth pain, gum tenderness, taste disturbance, smell disturbance, hearing aid, bleeding gums, dry mouth, sinus pain, sinus congestion and neck mass.    Eyes:  Negative for double vision, pain, redness, eye pain, decreased vision, eye watering, eye bulging, eye dryness, flashing lights, spots, floaters, strabismus, tunnel vision, jaundice and eye irritation.   Respiratory:    Negative for cough, hemoptysis, sputum production, shortness of breath, wheezing, sleep disturbances due to breathing, snores loudly, respiratory pain, dyspnea on exertion, cough disturbing sleep and postural dyspnea.    Cardiovascular:  Negative for chest pain, dyspnea on exertion, palpitations, orthopnea, claudication, leg swelling, fingers/toes turn blue, hypertension, hypotension, syncope, history of heart murmur, chest pain on exertion, chest pain at rest, pacemaker, few scattered varicosities, leg pain, sleep disturbances due to breathing, tachycardia, light-headedness, exercise intolerance and edema.   Gastrointestinal:  Negative for heartburn, nausea, vomiting, abdominal pain, diarrhea, constipation, blood in stool, melena, rectal pain, bloating, hemorrhoids, bowel incontinence, jaundice, rectal bleeding, coffee ground emesis and change in stool.   Genitourinary:  Negative for bladder incontinence, dysuria, urgency, hematuria, flank pain, vaginal discharge, difficulty urinating, genital sores, dyspareunia, decreased libido, nocturia, voiding less frequently, arousal difficulty, abnormal vaginal bleeding, excessive menstruation, menstrual changes, hot flashes, vaginal dryness and postmenopausal bleeding.   Musculoskeletal:  Negative for myalgias, back pain, joint swelling, arthralgias, stiffness, muscle cramps, neck pain, bone pain, muscle weakness and fracture.   Skin:  Positive for hair changes.   Neurological:  Negative for dizziness, tingling, tremors, speech change, seizures, loss of consciousness, weakness, light-headedness, numbness, headaches, disturbances in coordination, extremity numbness, memory loss, difficulty walking and paralysis.   Endo/Heme:  Negative for anemia, swollen glands and bruises/bleeds easily.   Psychiatric/Behavioral:  Negative for depression, hallucinations, memory loss, decreased concentration, mood swings and panic attacks.    Breast:  Negative for breast discharge, breast  mass, breast pain and nipple retraction.   Endocrine:  Negative for altered temperature regulation, polyphagia and polydipsia.        Past Medical History:    Past Medical History:   Diagnosis Date     Arthritis      PONV (postoperative nausea and vomiting)          Past Surgical History:    Past Surgical History:   Procedure Laterality Date     EXCISE VULVA WIDE LOCAL Right 6/3/2020    Procedure: Exam Under Anesthesia, ablation and partial excision of vulva, biopsy of vulva;  Surgeon: José Miguel Anthony MD;  Location: [facility] OR     EXCISE VULVA WIDE LOCAL Right 3/26/2021    Procedure: wide local excision of vulva;  Surgeon: José Miguel Anthony MD;  Location: [facility] OR     ORTHOPEDIC SURGERY      right knee replacement 2015         Health Maintenance Due   Topic Date Due     DEXA  Never done     ADVANCE CARE PLANNING  Never done     HIV SCREENING  Never done     HEPATITIS C SCREENING  Never done     LIPID  Never done     COLORECTAL CANCER SCREENING  02/12/2021     MEDICARE ANNUAL WELLNESS VISIT  Never done     FALL RISK ASSESSMENT  Never done     Pneumococcal Vaccine: 65+ Years (1 - PCV) Never done     PHQ-2 (once per calendar year)  Never done       Current Medications:     Current Outpatient Medications   Medication Sig Dispense Refill     bacitracin 500 UNIT/GM external ointment Apply topically 2 times daily To vulvar area       buPROPion (WELLBUTRIN XL) 300 MG 24 hr tablet every morning        Cholecalciferol (VITAMIN D3) 50 MCG (2000 UT) TABS 2,000 Int'l Units       imiquimod (ALDARA) 5 % external cream Apply topically three times a week 30 packet 0     levothyroxine (SYNTHROID/LEVOTHROID) 112 MCG tablet daily        lidocaine (XYLOCAINE) 2 % external gel Apply topically every 8 hours as needed for moderate pain 30 mL 0     liothyronine (CYTOMEL) 5 MCG tablet 15 mcg daily        magnesium 200 MG TABS daily        oxyCODONE (ROXICODONE) 5 MG tablet Take 1-2 tablets (5-10 mg) by mouth every 4 hours as needed  for moderate to severe pain 6 tablet 0     Progesterone Micronized (PROGESTERONE PO) Take 200 mg by mouth daily lozenge       senna-docusate (SENOKOT-S/PERICOLACE) 8.6-50 MG tablet Take 1-2 tablets by mouth 2 times daily 30 tablet 0     sertraline (ZOLOFT) 100 MG tablet Take 100 mg by mouth At Bedtime        TESTOSTERONE 2MG Takes 2.5 mg three times per week ( Mon, Wed, Fri)       traZODone (DESYREL) 25 mg TABS half-tab 50 mg At Bedtime Takes 1/2 tab (25 mg)       UNABLE TO FIND MEDICATION NAME: estrogen cream daily       vitamin B-12 (CYANOCOBALAMIN) 250 MCG tablet            Allergies:        Allergies   Allergen Reactions     Penicillins Hives        Social History:     Social History     Tobacco Use     Smoking status: Never Smoker     Smokeless tobacco: Never Used   Substance Use Topics     Alcohol use: Yes     Comment: socailly       History   Drug Use Unknown         Family History:     The patient's family history is notable for:    No family history on file.      Physical Exam:     General - WA/NAD  VS: /86   Pulse 83   Temp 98.2  F (36.8  C) (Oral)   Resp 16   Wt 57.4 kg (126 lb 9.6 oz)   LMP  (LMP Unknown)   SpO2 98%   BMI 23.16 kg/m    PS: 0    HEART: RRR  LUNGS CTA  ABD - soft NY/ND  LE: no sig edema    Pelvic: grossly normal post-flap exam.  AFter the application of acetic acid there are no additional abnormalities. No excoriations or masses.      Assessment:    Carly Diaz is a 65 year old woman  with a history of BRANDY II and BRANDY III.  She is s/p WLE 5/21/19 and ablation of right and left vulvar lesions using cautery 6/3/2020.  She is here today for a surveillance visit.        63 minutes spent on the date of the encounter doing chart review, history and exam, documentation, and further activities as noted above.      Plan:     1.) BRANDY - AUGUST on clinical examination - her flap has healed and softened     2.) Health maintenance:  Issues addressed today include following up with PCP for  annual health maintenance and non-gynecologic issues.       José Miguel Anthony MD    Division of Gynecologic Oncology      CC  Patient Care Team:  Rachel Umana as PCP - General (Family Practice)  José Miguel Anthony MD as Assigned Cancer Care Provider  JOSÉ MIGUEL ANTHONY      Again, thank you for allowing me to participate in the care of your patient.        Sincerely,        José Miguel Anthony MD

## 2022-05-16 NOTE — NURSING NOTE
"Oncology Rooming Note    May 16, 2022 8:28 AM   Carly Diaz is a 66 year old female who presents for:    Chief Complaint   Patient presents with     Oncology Clinic Visit     Pt is here for a rtn for Vulvar Intraepithelial Neoplasia     Initial Vitals: Blood Pressure 139/86   Pulse 83   Temperature 98.2  F (36.8  C) (Oral)   Respiration 16   Weight 57.4 kg (126 lb 9.6 oz)   Last Menstrual Period  (LMP Unknown)   Oxygen Saturation 98%   Body Mass Index 23.16 kg/m   Estimated body mass index is 23.16 kg/m  as calculated from the following:    Height as of 3/26/21: 1.575 m (5' 2\").    Weight as of this encounter: 57.4 kg (126 lb 9.6 oz). Body surface area is 1.58 meters squared.  No Pain (0) Comment: Data Unavailable   No LMP recorded (lmp unknown). Patient has had a hysterectomy.  Allergies reviewed: Yes  Medications reviewed: Yes    Medications: Medication refills not needed today.  Pharmacy name entered into [employer]: [id number], [place] - [address] AT  [address] [place] & [facility]    Clinical concerns: none       Christiane Ayers MA            "

## 2022-05-17 NOTE — PATIENT INSTRUCTIONS
It was a pleasure seeing you in clinic today-please reach out if there are any further questions that arise following today's visit.  During business hours, you may reach me at [phone].  For urgent/emergent questions after business hours, you may reach the on-call Gynecologic Oncology Resident through the [facility]  at [phone].    All normal test results are usually communicated via letter or [employer] message.  Abnormal results (those that require a change in the previously discussed plan of care) are usually communicated via a phone call.    I recommend signing up for [facility] access if you have not already done so.  This allows you online access to your lab results and also helps you communicate efficiently with your clinic should any questions arise in your care.    Follow up appointment in 6 months with NP, scheduling will call you to help make an appointment    Dr Gabrielle Mijares RN  Phone:  [phone]  Fax:  [phone]

## 2022-09-18 ENCOUNTER — HEALTH MAINTENANCE LETTER (OUTPATIENT)
Age: 67
End: 2022-09-18

## 2022-11-14 NOTE — PROGRESS NOTES
"Gynecologic Oncology Return Visit Note    Date: 11/15/2022    RE: Carly Diaz  : 1955  STEFANIA: 11/15/2022    CC: BRANDY II and BRANDY III     HPI:  Carly Diaz is a 67 year old woman with a history of BRANDY II and BRANDY III.  Most recently she is s/p excision and ablation 3/26/21.  She is here today for a surveillance visit.      Relevant History:  Noted vulvar skin is raw, painful and itchy with fissures. Had tried using clobetasol x2 weeks without any relief. On HRT (progesterone, estrogen, testosterone).    Vulvar symptoms of have been present on and off since . Spontaneously resolved; however, had recurrence of symptoms in . 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    Per Dr Ornelas's exam: \"The skin of the perineum extending to about 1/2 way up the labia minor is raised, raw and brightly erythematous. Small fissures are present in the tissue. No scaly white tissue paper appearing skin is present. These changes do not extend down to the rectum or up toward the clitoral oneil. Vagina is without discharge or lesions. Vaginal tissue appears normal.\"    3/27/19 Right Perineum Biopsy  DIAGNOSIS:   Right perineum, biopsy:   1. High grade squamous intraepithelial lesion (BRANDY II-III).   2. Benign epidermal inclusion cyst.   3. No HSV inclusions identified on immunostain.      19: Wide local excision of the vulva:  A. Vulva, wide excision:    High-grade squamous epithelial dysplasia (EVON 3, high-grade SAIRA), positive margin at 9:00.-12:00 including the 12:00 tip    Her surgery was delayed owing to COVID pandemic.       6/3/2020: Examination under anesthesia, biopsy of right vulva, ablation of right and left vulvar lesions using cautery.   FINAL DIAGNOSIS:   Vulva, right mid labium, biopsy:   -Low grade squamous intraepithelial lesion (vulvar intraepithelial   neoplasia 1)     3/6/21 Vulva, right, punch biopsy:   - High grade squamous intraepithelial lesion (vulvar intraepithelial "   neoplasia 3, BRANDY 3)   - BRANDY 3 is present at one edge of the biopsy material      3/26/21: Excision and ablation  PATH:  FINAL DIAGNOSIS:   A. RIGHT VULVA MEDIAL MARGIN 5 O'CLOCK:   - Vulvar tissue with no significant histologic abnormality     B. RIGHT VULVA MEDIAL MARGIN 1 O'CLOCK:   - Vulvar tissue with no significant histologic abnormality     C. RIGHT VULVA, EXCISION:   - Vulvar tissue with reactive/regenerative changes   - Negative for dysplasia     D. RIGHT LABIA MINORA, EXCISION:   - High grade squamous intraepithelial lesion (vulvar intraepithelial   neoplasia 3)   - Margins negative for dysplasia   - Focal epithelial erosion                    Today she has a clinic feeling well overall and is without concern.  She denies any vaginal bleeding, no changes in her bowel or bladder habits, no nausea/emesis, no lower extremity edema, and no difficulties eating or sleeping. She denies any abdominal discomfort/bloating, no fevers or chills, and no chest pain or shortness of breath. She checks her vulva on a regular basis and hasn't noted any new lesions or bumps.  She denies vulvar irritation or itching.  She did have a hysterectomy in the 90s which included her cervix.  Reports no history of abnormal Paps.                   Health Maintenance  FIT: 2/10/2020, negative repeat in 3 years  Mammogram: 2/2/22  Annual physical: 3/16/22  Dexa: 8/5/22, consider repeat in 4 years  COVID vaccine: 8/18/21, 9/8/21, 3/10/22 Pfizer      Review of Systems:    Systemic           no weight changes; no fever; no chills; no night sweats; no appetite changes  Skin           no rashes, or lesions  Eye           no irritation; no changes in vision  Diego-Laryngeal           no dysphagia; no hoarseness   Pulmonary    no cough; no shortness of breath  Cardiovascular    no chest pain; no palpitations  Gastrointestinal    no diarrhea; no constipation; no abdominal pain; no changes in bowel habits; no blood in stool  Genitourinary   no  urinary frequency; no urinary urgency; no dysuria; no pain; no abnormal vaginal discharge; no abnormal vaginal bleeding  Breast   no breast discharge; no breast changes; no breast pain  Musculoskeletal    no myalgias; no arthralgias; no back pain  Psychiatric           no depressed mood; no anxiety    Hematologic   no tender lymph nodes; no noticeable swellings or lumps   Endocrine    no hot flashes; no heat/cold intolerance         Neurological   no tremor; no numbness and tingling; no headaches; no difficulty sleeping      Past Medical History:    Past Medical History:   Diagnosis Date     Arthritis      PONV (postoperative nausea and vomiting)          Past Surgical History:    Past Surgical History:   Procedure Laterality Date     EXCISE VULVA WIDE LOCAL Right 6/3/2020    Procedure: Exam Under Anesthesia, ablation and partial excision of vulva, biopsy of vulva;  Surgeon: José Miguel Anthony MD;  Location: [facility] OR     EXCISE VULVA WIDE LOCAL Right 3/26/2021    Procedure: wide local excision of vulva;  Surgeon: José Miguel Anthony MD;  Location: [facility] OR     ORTHOPEDIC SURGERY      right knee replacement 2015         Health Maintenance Due   Topic Date Due     DEXA  Never done     ADVANCE CARE PLANNING  Never done     COLORECTAL CANCER SCREENING  Never done     HEPATITIS C SCREENING  Never done     LIPID  Never done     FALL RISK ASSESSMENT  Never done     Pneumococcal Vaccine: 65+ Years (1 - PCV) Never done     PHQ-2 (once per calendar year)  Never done     MEDICARE ANNUAL WELLNESS VISIT  01/12/2022     MAMMO SCREENING  01/19/2022     COVID-19 Vaccine (4 - Booster for Pfizer series) 05/05/2022     INFLUENZA VACCINE (1) Never done       Current Medications:     Current Outpatient Medications   Medication Sig Dispense Refill     Estradiol-Estriol-Progesterone (BIEST/PROGESTERONE TD) 50/50 mg 1.25 mg       methylnaltrexone (RELISTOR) 12 MG/0.6ML SOLN injection        progesterone (PROMETRIUM) 200 MG capsule  Take 300 mg by mouth daily       bacitracin 500 UNIT/GM external ointment Apply topically 2 times daily To vulvar area       buPROPion (WELLBUTRIN XL) 300 MG 24 hr tablet Take 1 tablet by mouth daily (Patient not taking: Reported on 11/15/2022)       buPROPion (WELLBUTRIN XL) 300 MG 24 hr tablet every morning        Cholecalciferol (VITAMIN D3) 50 MCG (2000 UT) TABS 2,000 Int'l Units       imiquimod (ALDARA) 5 % external cream Apply topically three times a week 30 packet 0     levothyroxine (SYNTHROID/LEVOTHROID) 112 MCG tablet daily        lidocaine (XYLOCAINE) 2 % external gel Apply topically every 8 hours as needed for moderate pain 30 mL 0     liothyronine (CYTOMEL) 5 MCG tablet 15 mcg daily        magnesium 200 MG TABS daily        oxyCODONE (ROXICODONE) 5 MG tablet Take 1-2 tablets (5-10 mg) by mouth every 4 hours as needed for moderate to severe pain 6 tablet 0     Progesterone Micronized (PROGESTERONE PO) Take 200 mg by mouth daily lozenge       senna-docusate (SENOKOT-S/PERICOLACE) 8.6-50 MG tablet Take 1-2 tablets by mouth 2 times daily 30 tablet 0     sertraline (ZOLOFT) 100 MG tablet Take 100 mg by mouth At Bedtime        TESTOSTERONE 2MG Takes 1.5 mg three times per week ( Mon, Wed, Fri)       traZODone (DESYREL) 25 mg TABS half-tab 50 mg At Bedtime Takes 1/2 tab (25 mg)       UNABLE TO FIND MEDICATION NAME: estrogen cream daily       vitamin B-12 (CYANOCOBALAMIN) 250 MCG tablet            Allergies:        Allergies   Allergen Reactions     Penicillins Hives        Social History:     Social History     Tobacco Use     Smoking status: Never     Smokeless tobacco: Never   Substance Use Topics     Alcohol use: Yes     Comment: socailly       History   Drug Use Unknown         Family History:     The patient's family history is notable for:    No family history on file.      Physical Exam:     /83   Pulse 75   Temp 98.6  F (37  C) (Oral)   Resp 16   Wt 58.7 kg (129 lb 4.8 oz)   LMP  (LMP  Unknown)   SpO2 100%   BMI 23.65 kg/m    Body mass index is 23.65 kg/m .    General Appearance: healthy and alert, no distress     HEENT: no palpable nodules or masses        Cardiovascular: regular rate and rhythm, no gallops, rubs or murmurs     Respiratory: lungs clear, no rales, rhonchi or wheezes    Musculoskeletal: extremities non tender and without edema    Skin: no lesions or rashes     Neurological: normal gait, no gross defects     Psychiatric: appropriate mood and affect                               Hematological: normal cervical, supraclavicular and inguinal lymph nodes     Gastrointestinal:       abdomen soft, non-tender, non-distended, no organomegaly or masses    Genitourinary: External genitalia and urethral meatus appears consistent with prior surgeries.  No visible lesions or palpable masses. No aceto-white changes with application of dilute acetic acid. Cervix is surgically absent.  Bimanual exam reveals no masses, nodularity, or fullness      Assessment:    Carly Diaz is a 67 year old woman with a history of BRANDY II and BRANDY III.  Most recently she is s/p excision and ablation 3/26/21.  She is here today for a surveillance visit.    20 minutes spent on the date of the encounter doing chart review, history and exam, documentation, and further activities as noted above.      Plan:     1.) Vulvar dysplasia:  AUGUST on exam.  RTC in 6 months for next surveillance visit.  Reviewed signs and symptoms for when she should contact the clinic or seek additional care.  Patient to contact the clinic with any questions or concerns in the interim.        Genetic risk factors were assessed and she does not meet qualification for referral.      Labs and/or tests ordered include:  None.     2.) Health maintenance:  Issues addressed today include following up with PCP for annual health maintenance and non-gynecologic issues.       Camila Edwards, DNP, APRN, FNP-C  Nurse Practitioner  Division of Gynecologic  Oncology  Pager: [phone]     CC  Patient Care Team:  Rachel Umana as PCP - General (Family Practice)  José Miguel Anthony MD as Assigned Cancer Care Provider  SELF, REFERRED

## 2022-11-15 ENCOUNTER — ONCOLOGY VISIT (OUTPATIENT)
Dept: ONCOLOGY | Facility: CLINIC | Age: 67
End: 2022-11-15
Attending: OBSTETRICS & GYNECOLOGY
Payer: COMMERCIAL

## 2022-11-15 VITALS
DIASTOLIC BLOOD PRESSURE: 83 MMHG | RESPIRATION RATE: 16 BRPM | WEIGHT: 129.3 LBS | OXYGEN SATURATION: 100 % | SYSTOLIC BLOOD PRESSURE: 125 MMHG | BODY MASS INDEX: 23.65 KG/M2 | HEART RATE: 75 BPM | TEMPERATURE: 98.6 F

## 2022-11-15 DIAGNOSIS — D07.1 VIN III (VULVAR INTRAEPITHELIAL NEOPLASIA III): Primary | ICD-10-CM

## 2022-11-15 PROCEDURE — G0463 HOSPITAL OUTPT CLINIC VISIT: HCPCS

## 2022-11-15 PROCEDURE — 99213 OFFICE O/P EST LOW 20 MIN: CPT | Performed by: NURSE PRACTITIONER

## 2022-11-15 RX ORDER — BUPROPION HYDROCHLORIDE 300 MG/1
1 TABLET ORAL DAILY
COMMUNITY
Start: 2022-04-21 | End: 2023-05-15

## 2022-11-15 RX ORDER — PROGESTERONE 200 MG/1
300 CAPSULE ORAL DAILY
COMMUNITY
End: 2023-05-15

## 2022-11-15 ASSESSMENT — PAIN SCALES - GENERAL: PAINLEVEL: NO PAIN (0)

## 2022-11-15 NOTE — LETTER
"    11/15/2022         RE: Carly Diaz  [address] [facility] [address]        Dear Colleague,    Thank you for referring your patient, Carly Diaz, to the [facility]. Please see a copy of my visit note below.    Gynecologic Oncology Return Visit Note    Date: 11/15/2022    RE: Carly Diaz  : 1955  STEFANIA: 11/15/2022    CC: BRANDY II and BRANDY III     HPI:  Carly Diaz is a 67 year old woman with a history of BRANDY II and BRANDY III.  Most recently she is s/p excision and ablation 3/26/21.  She is here today for a surveillance visit.      Relevant History:  Noted vulvar skin is raw, painful and itchy with fissures. Had tried using clobetasol x2 weeks without any relief. On HRT (progesterone, estrogen, testosterone).    Vulvar symptoms of have been present on and off since . Spontaneously resolved; however, had recurrence of symptoms in . 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    Per Dr Ornelas's exam: \"The skin of the perineum extending to about 1/2 way up the labia minor is raised, raw and brightly erythematous. Small fissures are present in the tissue. No scaly white tissue paper appearing skin is present. These changes do not extend down to the rectum or up toward the clitoral oneil. Vagina is without discharge or lesions. Vaginal tissue appears normal.\"    3/27/19 Right Perineum Biopsy  DIAGNOSIS:   Right perineum, biopsy:   1. High grade squamous intraepithelial lesion (BRANDY II-III).   2. Benign epidermal inclusion cyst.   3. No HSV inclusions identified on immunostain.      19: Wide local excision of the vulva:  A. Vulva, wide excision:    High-grade squamous epithelial dysplasia (EVON 3, high-grade SAIRA), positive margin at 9:00.-12:00 including the 12:00 tip    Her surgery was delayed owing to COVID pandemic.       6/3/2020: Examination under anesthesia, biopsy of right vulva, ablation of right and left vulvar lesions using " cautery.   FINAL DIAGNOSIS:   Vulva, right mid labium, biopsy:   -Low grade squamous intraepithelial lesion (vulvar intraepithelial   neoplasia 1)     3/6/21 Vulva, right, punch biopsy:   - High grade squamous intraepithelial lesion (vulvar intraepithelial   neoplasia 3, BRANDY 3)   - BRANDY 3 is present at one edge of the biopsy material      3/26/21: Excision and ablation  PATH:  FINAL DIAGNOSIS:   A. RIGHT VULVA MEDIAL MARGIN 5 O'CLOCK:   - Vulvar tissue with no significant histologic abnormality     B. RIGHT VULVA MEDIAL MARGIN 1 O'CLOCK:   - Vulvar tissue with no significant histologic abnormality     C. RIGHT VULVA, EXCISION:   - Vulvar tissue with reactive/regenerative changes   - Negative for dysplasia     D. RIGHT LABIA MINORA, EXCISION:   - High grade squamous intraepithelial lesion (vulvar intraepithelial   neoplasia 3)   - Margins negative for dysplasia   - Focal epithelial erosion                    Today she has a clinic feeling well overall and is without concern.  She denies any vaginal bleeding, no changes in her bowel or bladder habits, no nausea/emesis, no lower extremity edema, and no difficulties eating or sleeping. She denies any abdominal discomfort/bloating, no fevers or chills, and no chest pain or shortness of breath. She checks her vulva on a regular basis and hasn't noted any new lesions or bumps.  She denies vulvar irritation or itching.  She did have a hysterectomy in the 90s which included her cervix.  Reports no history of abnormal Paps.                   Health Maintenance  FIT: 2/10/2020, negative repeat in 3 years  Mammogram: 2/2/22  Annual physical: 3/16/22  Dexa: 8/5/22, consider repeat in 4 years  COVID vaccine: 8/18/21, 9/8/21, 3/10/22 Pfizer      Review of Systems:    Systemic           no weight changes; no fever; no chills; no night sweats; no appetite changes  Skin           no rashes, or lesions  Eye           no irritation; no changes in vision  Diego-Laryngeal           no  dysphagia; no hoarseness   Pulmonary    no cough; no shortness of breath  Cardiovascular    no chest pain; no palpitations  Gastrointestinal    no diarrhea; no constipation; no abdominal pain; no changes in bowel habits; no blood in stool  Genitourinary   no urinary frequency; no urinary urgency; no dysuria; no pain; no abnormal vaginal discharge; no abnormal vaginal bleeding  Breast   no breast discharge; no breast changes; no breast pain  Musculoskeletal    no myalgias; no arthralgias; no back pain  Psychiatric           no depressed mood; no anxiety    Hematologic   no tender lymph nodes; no noticeable swellings or lumps   Endocrine    no hot flashes; no heat/cold intolerance         Neurological   no tremor; no numbness and tingling; no headaches; no difficulty sleeping      Past Medical History:    Past Medical History:   Diagnosis Date     Arthritis      PONV (postoperative nausea and vomiting)          Past Surgical History:    Past Surgical History:   Procedure Laterality Date     EXCISE VULVA WIDE LOCAL Right 6/3/2020    Procedure: Exam Under Anesthesia, ablation and partial excision of vulva, biopsy of vulva;  Surgeon: José Miguel Anthony MD;  Location: [facility] OR     EXCISE VULVA WIDE LOCAL Right 3/26/2021    Procedure: wide local excision of vulva;  Surgeon: José Miguel Anthony MD;  Location: [facility] OR     ORTHOPEDIC SURGERY      right knee replacement 2015         Health Maintenance Due   Topic Date Due     DEXA  Never done     ADVANCE CARE PLANNING  Never done     COLORECTAL CANCER SCREENING  Never done     HEPATITIS C SCREENING  Never done     LIPID  Never done     FALL RISK ASSESSMENT  Never done     Pneumococcal Vaccine: 65+ Years (1 - PCV) Never done     PHQ-2 (once per calendar year)  Never done     MEDICARE ANNUAL WELLNESS VISIT  01/12/2022     MAMMO SCREENING  01/19/2022     COVID-19 Vaccine (4 - Booster for Pfizer series) 05/05/2022     INFLUENZA VACCINE (1) Never done       Current  Medications:     Current Outpatient Medications   Medication Sig Dispense Refill     Estradiol-Estriol-Progesterone (BIEST/PROGESTERONE TD) 50/50 mg 1.25 mg       methylnaltrexone (RELISTOR) 12 MG/0.6ML SOLN injection        progesterone (PROMETRIUM) 200 MG capsule Take 300 mg by mouth daily       bacitracin 500 UNIT/GM external ointment Apply topically 2 times daily To vulvar area       buPROPion (WELLBUTRIN XL) 300 MG 24 hr tablet Take 1 tablet by mouth daily (Patient not taking: Reported on 11/15/2022)       buPROPion (WELLBUTRIN XL) 300 MG 24 hr tablet every morning        Cholecalciferol (VITAMIN D3) 50 MCG (2000 UT) TABS 2,000 Int'l Units       imiquimod (ALDARA) 5 % external cream Apply topically three times a week 30 packet 0     levothyroxine (SYNTHROID/LEVOTHROID) 112 MCG tablet daily        lidocaine (XYLOCAINE) 2 % external gel Apply topically every 8 hours as needed for moderate pain 30 mL 0     liothyronine (CYTOMEL) 5 MCG tablet 15 mcg daily        magnesium 200 MG TABS daily        oxyCODONE (ROXICODONE) 5 MG tablet Take 1-2 tablets (5-10 mg) by mouth every 4 hours as needed for moderate to severe pain 6 tablet 0     Progesterone Micronized (PROGESTERONE PO) Take 200 mg by mouth daily lozenge       senna-docusate (SENOKOT-S/PERICOLACE) 8.6-50 MG tablet Take 1-2 tablets by mouth 2 times daily 30 tablet 0     sertraline (ZOLOFT) 100 MG tablet Take 100 mg by mouth At Bedtime        TESTOSTERONE 2MG Takes 1.5 mg three times per week ( Mon, Wed, Fri)       traZODone (DESYREL) 25 mg TABS half-tab 50 mg At Bedtime Takes 1/2 tab (25 mg)       UNABLE TO FIND MEDICATION NAME: estrogen cream daily       vitamin B-12 (CYANOCOBALAMIN) 250 MCG tablet            Allergies:        Allergies   Allergen Reactions     Penicillins Hives        Social History:     Social History     Tobacco Use     Smoking status: Never     Smokeless tobacco: Never   Substance Use Topics     Alcohol use: Yes     Comment: socailly        History   Drug Use Unknown         Family History:     The patient's family history is notable for:    No family history on file.      Physical Exam:     /83   Pulse 75   Temp 98.6  F (37  C) (Oral)   Resp 16   Wt 58.7 kg (129 lb 4.8 oz)   LMP  (LMP Unknown)   SpO2 100%   BMI 23.65 kg/m    Body mass index is 23.65 kg/m .    General Appearance: healthy and alert, no distress     HEENT: no palpable nodules or masses        Cardiovascular: regular rate and rhythm, no gallops, rubs or murmurs     Respiratory: lungs clear, no rales, rhonchi or wheezes    Musculoskeletal: extremities non tender and without edema    Skin: no lesions or rashes     Neurological: normal gait, no gross defects     Psychiatric: appropriate mood and affect                               Hematological: normal cervical, supraclavicular and inguinal lymph nodes     Gastrointestinal:       abdomen soft, non-tender, non-distended, no organomegaly or masses    Genitourinary: External genitalia and urethral meatus appears consistent with prior surgeries.  No visible lesions or palpable masses. No aceto-white changes with application of dilute acetic acid. Cervix is surgically absent.  Bimanual exam reveals no masses, nodularity, or fullness      Assessment:    Carly Diaz is a 67 year old woman with a history of BRANDY II and BRANDY III.  Most recently she is s/p excision and ablation 3/26/21.  She is here today for a surveillance visit.    20 minutes spent on the date of the encounter doing chart review, history and exam, documentation, and further activities as noted above.      Plan:     1.) Vulvar dysplasia:  AUGUST on exam.  RTC in 6 months for next surveillance visit.  Reviewed signs and symptoms for when she should contact the clinic or seek additional care.  Patient to contact the clinic with any questions or concerns in the interim.        Genetic risk factors were assessed and she does not meet qualification for  referral.      Labs and/or tests ordered include:  None.     2.) Health maintenance:  Issues addressed today include following up with PCP for annual health maintenance and non-gynecologic issues.       Camila Edwards, DNP, APRN, FNP-C  Nurse Practitioner  Division of Gynecologic Oncology  Pager: [phone]     CC  Patient Care Team:  Rachel Umana as PCP - General (Family Practice)  José Miguel Anthony MD as Assigned Cancer Care Provider  SELF, REFERRED

## 2023-01-29 ENCOUNTER — HEALTH MAINTENANCE LETTER (OUTPATIENT)
Age: 68
End: 2023-01-29

## 2023-05-11 NOTE — PROGRESS NOTES
"Gynecologic Oncology Return Visit Note    Date: May 15, 2023     RE: Carly Diaz  : 1955  STEFANIA: May 15, 2023     CC: BRANDY II and BRANDY III     HPI:  Carly Daiz is a 67 year old woman with a history of BRANDY II and BRANDY III.  Most recently she is s/p excision and ablation 3/26/21.  She is here today for a surveillance visit.      Relevant History:  Noted vulvar skin is raw, painful and itchy with fissures. Had tried using clobetasol x2 weeks without any relief. On HRT (progesterone, estrogen, testosterone).    Vulvar symptoms of have been present on and off since . Spontaneously resolved; however, had recurrence of symptoms in . 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    Per Dr Ornelas's exam: \"The skin of the perineum extending to about 1/2 way up the labia minor is raised, raw and brightly erythematous. Small fissures are present in the tissue. No scaly white tissue paper appearing skin is present. These changes do not extend down to the rectum or up toward the clitoral oneil. Vagina is without discharge or lesions. Vaginal tissue appears normal.\"    3/27/19 Right Perineum Biopsy  DIAGNOSIS:   Right perineum, biopsy:   1. High grade squamous intraepithelial lesion (BRANDY II-III).   2. Benign epidermal inclusion cyst.   3. No HSV inclusions identified on immunostain.      19: Wide local excision of the vulva:  A. Vulva, wide excision:    High-grade squamous epithelial dysplasia (EVON 3, high-grade SAIRA), positive margin at 9:00.-12:00 including the 12:00 tip    Her surgery was delayed owing to COVID pandemic.       6/3/2020: Examination under anesthesia, biopsy of right vulva, ablation of right and left vulvar lesions using cautery.   FINAL DIAGNOSIS:   Vulva, right mid labium, biopsy:   -Low grade squamous intraepithelial lesion (vulvar intraepithelial   neoplasia 1)      3/6/21 Vulva, right, punch biopsy:   - High grade squamous intraepithelial lesion (vulvar " intraepithelial   neoplasia 3, BRANDY 3)   - BRANDY 3 is present at one edge of the biopsy material     3/26/21: Excision and ablation  PATH:  FINAL DIAGNOSIS:   A. RIGHT VULVA MEDIAL MARGIN 5 O'CLOCK:   - Vulvar tissue with no significant histologic abnormality   B. RIGHT VULVA MEDIAL MARGIN 1 O'CLOCK:   - Vulvar tissue with no significant histologic abnormality   C. RIGHT VULVA, EXCISION:   - Vulvar tissue with reactive/regenerative changes   - Negative for dysplasia   D. RIGHT LABIA MINORA, EXCISION:   - High grade squamous intraepithelial lesion (vulvar intraepithelial   neoplasia 3)   - Margins negative for dysplasia   - Focal epithelial erosion              Today she comes to clinic feeling well overall and is without concern.  She checks her vulva on a regular basis and hasn't noted any new lesions or bumps.  She denies vulvar irritation or itching.  She denies any vaginal bleeding, no changes in her bowel or bladder habits, no nausea/emesis, no lower extremity edema, and no difficulties eating or sleeping. She denies any abdominal discomfort/bloating, no fevers or chills, and no chest pain or shortness of breath.  She is current with her annual physical, mammogram, colon cancer screening, and DEXA scan.  Her last recurrence happened during a stressful time.  She does have some stress coming up and is wondering if she can have a prescription for Aldara just in case.          Health Maintenance  FIT: 11/14/22, negative repeat in 3 years  Mammogram: 2/17/23  Annual physical: 2/24/23  Dexa: 8/5/22, consider repeat in 4 years      Review of Systems     Constitutional:  Negative for fever, chills, weight loss, weight gain, fatigue, decreased appetite, night sweats, recent stressors, height gain, height loss, post-operative complications, incisional pain, hallucinations, increased energy, hyperactivity and confused.   HENT:  Negative for ear pain, hearing loss, tinnitus, nosebleeds, trouble swallowing, hoarse voice,  mouth sores, sore throat, ear discharge, tooth pain, gum tenderness, taste disturbance, smell disturbance, hearing aid, bleeding gums, dry mouth, sinus pain, sinus congestion and neck mass.    Eyes:  Negative for double vision, pain, redness, eye pain, decreased vision, eye watering, eye bulging, eye dryness, flashing lights, spots, floaters, strabismus, tunnel vision, jaundice and eye irritation.   Respiratory:   Negative for cough, hemoptysis, sputum production, shortness of breath, wheezing, sleep disturbances due to breathing, snores loudly, respiratory pain, dyspnea on exertion, cough disturbing sleep and postural dyspnea.    Cardiovascular:  Negative for chest pain, dyspnea on exertion, palpitations, orthopnea, claudication, leg swelling, fingers/toes turn blue, hypertension, hypotension, syncope, history of heart murmur, chest pain on exertion, chest pain at rest, pacemaker, few scattered varicosities, leg pain, sleep disturbances due to breathing, tachycardia, light-headedness, exercise intolerance and edema.   Gastrointestinal:  Negative for heartburn, nausea, vomiting, abdominal pain, diarrhea, constipation, blood in stool, melena, rectal pain, bloating, hemorrhoids, bowel incontinence, jaundice, rectal bleeding, coffee ground emesis and change in stool.   Genitourinary:  Negative for bladder incontinence, dysuria, urgency, hematuria, flank pain, vaginal discharge, difficulty urinating, genital sores, dyspareunia, decreased libido, nocturia, voiding less frequently, arousal difficulty, abnormal vaginal bleeding, excessive menstruation, menstrual changes, hot flashes, vaginal dryness and postmenopausal bleeding.   Musculoskeletal:  Negative for myalgias, back pain, joint swelling, arthralgias, stiffness, muscle cramps, neck pain, bone pain, muscle weakness and fracture.   Skin:  Negative for nail changes, itching, poor wound healing, rash, hair changes, skin changes, acne, warts, poor wound healing,  scarring, flaky skin, Raynaud's phenomenon, sensitivity to sunlight and skin thickening.   Neurological:  Negative for dizziness, tingling, tremors, speech change, seizures, loss of consciousness, weakness, light-headedness, numbness, headaches, disturbances in coordination, extremity numbness, memory loss, difficulty walking and paralysis.   Endo/Heme:  Negative for anemia, swollen glands and bruises/bleeds easily.   Psychiatric/Behavioral:  Negative for depression, hallucinations, memory loss, decreased concentration, mood swings and panic attacks.    Breast:  Negative for breast discharge, breast mass, breast pain and nipple retraction.   Endocrine:  Negative for altered temperature regulation, polyphagia, polydipsia, unwanted hair growth and change in facial hair.          Past Medical History:    Past Medical History:   Diagnosis Date     Arthritis      PONV (postoperative nausea and vomiting)          Past Surgical History:    Past Surgical History:   Procedure Laterality Date     EXCISE VULVA WIDE LOCAL Right 6/3/2020    Procedure: Exam Under Anesthesia, ablation and partial excision of vulva, biopsy of vulva;  Surgeon: José Miguel Anthony MD;  Location: [facility] OR     EXCISE VULVA WIDE LOCAL Right 3/26/2021    Procedure: wide local excision of vulva;  Surgeon: José Miguel Anthony MD;  Location: [facility] OR     ORTHOPEDIC SURGERY      right knee replacement 2015         Health Maintenance Due   Topic Date Due     TSH W/FREE T4 REFLEX  Never done     ADVANCE CARE PLANNING  Never done     COLORECTAL CANCER SCREENING  Never done     HEPATITIS C SCREENING  Never done     LIPID  Never done     FALL RISK ASSESSMENT  Never done     Pneumococcal Vaccine: 65+ Years (1 - PCV) Never done     MEDICARE ANNUAL WELLNESS VISIT  01/12/2022     COVID-19 Vaccine (4 - Pfizer series) 05/05/2022     INFLUENZA VACCINE (1) Never done     PHQ-2 (once per calendar year)  Never done       Current Medications:     Current Outpatient  Medications   Medication Sig Dispense Refill     BIOTIN PO Take 8 mg by mouth       buPROPion (WELLBUTRIN XL) 300 MG 24 hr tablet every morning        Cholecalciferol (VITAMIN D-3) 125 MCG (5000 UT) TABS With K2       Estradiol-Estriol-Progesterone (BIEST/PROGESTERONE TD) 50/50 mg 1.25 mg       l-lysine HCl 500 MG TABS tablet Take 500 mg by mouth daily       levothyroxine (SYNTHROID/LEVOTHROID) 112 MCG tablet daily        liothyronine (CYTOMEL) 5 MCG tablet 10 mcg daily       magnesium 200 MG TABS daily        Methylcobalamin (METHYL B-12) 1000 MCG LOZG        Nutritional Supplements (PYCNOGENOL PO) Take 50 mg by mouth       Progesterone Micronized (PROGESTERONE PO) Take 200 mg by mouth daily lozenge       senna-docusate (SENOKOT-S/PERICOLACE) 8.6-50 MG tablet Take 1-2 tablets by mouth 2 times daily 30 tablet 0     sertraline (ZOLOFT) 100 MG tablet Take 100 mg by mouth At Bedtime        TESTOSTERONE 2MG Takes 1.5 mg three times per week ( Mon, Wed, Fri)       traZODone (DESYREL) 25 mg TABS half-tab 50 mg At Bedtime Takes 1/2 tab (25 mg)       UNABLE TO FIND MEDICATION NAME: [id number]       UNABLE TO FIND MEDICATION NAME: iodine thyrsoine       imiquimod (ALDARA) 5 % external cream Apply topically three times a week (Patient not taking: Reported on 5/15/2023) 30 packet 0         Allergies:        Allergies   Allergen Reactions     Penicillin G Rash     Penicillins Hives        Social History:     Social History     Tobacco Use     Smoking status: Never     Smokeless tobacco: Never   Vaping Use     Vaping status: Not on file   Substance Use Topics     Alcohol use: Yes     Comment: socailly       History   Drug Use Unknown         Family History:     The patient's family history is notable for:    No family history on file.      Physical Exam:     /80 (BP Location: Right arm, Patient Position: Sitting, Cuff Size: Adult Regular)   Pulse 76   Temp 97.2  F (36.2  C) (Oral)   Resp 16   Wt 55.7 kg (122 lb 12.8 oz)  "  LMP  (LMP Unknown)   SpO2 99%   BMI 22.46 kg/m    Body mass index is 22.46 kg/m .    General Appearance: healthy and alert, no distress     HEENT: no palpable nodules or masses        Cardiovascular: regular rate and rhythm, no gallops, rubs or murmurs     Respiratory: lungs clear, no rales, rhonchi or wheezes    Musculoskeletal: extremities non tender and without edema    Skin: no lesions or rashes     Neurological: normal gait, no gross defects     Psychiatric: appropriate mood and affect                               Hematological: normal cervical, supraclavicular and inguinal lymph nodes     Gastrointestinal:       abdomen soft, non-tender, non-distended, no organomegaly or masses    Genitourinary: External genitalia and urethral meatus appears consistent with prior surgeries.  No visible lesions or palpable masses. No aceto-white changes with application of dilute acetic acid.       Assessment:    Carly Diaz is a 67 year old woman with a history of BRANDY II and BRANDY III.  Most recently she is s/p excision and ablation 3/26/21.  She is here today for a surveillance visit.      15 minutes spent on the date of the encounter doing chart review, history and exam, documentation, and further activities as noted above.      Plan:     1.)        Vulvar dysplasia:  AUGUST on exam.  Discussed that if she has no concerning lesions would not recommend a prescription for Aldara at home as a \"just in case\" treatment option.  Offered visit here in clinic in a month for reexam which she declined.  RTC in 6 months for next surveillance visit.    Encouraged her to reach out to clinic if she notices any changes.  Reviewed signs and symptoms for when she should contact the clinic or seek additional care.  Patient to contact the clinic with any questions or concerns in the interim.        Genetic risk factors were assessed and she does not meet qualification for referral.       Labs and/or tests ordered include:  None.           "      2.)        Health maintenance:  Issues addressed today include following up with PCP for annual health maintenance and non-gynecologic issues.     Camila Edwards DNP, APRN, FNP-C  Nurse Practitioner  Division of Gynecologic Oncology  Pager: [phone]     CC  Patient Care Team:  Rachel Umana as PCP - General (Family Practice)  Camila Edwards APRN CNP as Assigned Cancer Care Provider

## 2023-05-15 ENCOUNTER — ONCOLOGY VISIT (OUTPATIENT)
Dept: ONCOLOGY | Facility: CLINIC | Age: 68
End: 2023-05-15
Attending: NURSE PRACTITIONER
Payer: COMMERCIAL

## 2023-05-15 VITALS
SYSTOLIC BLOOD PRESSURE: 130 MMHG | TEMPERATURE: 97.2 F | RESPIRATION RATE: 16 BRPM | OXYGEN SATURATION: 99 % | DIASTOLIC BLOOD PRESSURE: 80 MMHG | WEIGHT: 122.8 LBS | HEART RATE: 76 BPM | BODY MASS INDEX: 22.46 KG/M2

## 2023-05-15 DIAGNOSIS — D07.1 VIN III (VULVAR INTRAEPITHELIAL NEOPLASIA III): Primary | ICD-10-CM

## 2023-05-15 DIAGNOSIS — L90.0 LICHEN SCLEROSUS: ICD-10-CM

## 2023-05-15 PROCEDURE — 99212 OFFICE O/P EST SF 10 MIN: CPT | Performed by: NURSE PRACTITIONER

## 2023-05-15 PROCEDURE — G0463 HOSPITAL OUTPT CLINIC VISIT: HCPCS | Performed by: NURSE PRACTITIONER

## 2023-05-15 RX ORDER — LYSINE HCL 500 MG
500 TABLET ORAL DAILY
COMMUNITY

## 2023-05-15 RX ORDER — MECOBALAMIN 1000 MCG
TABLET,CHEWABLE ORAL
COMMUNITY
Start: 2021-01-02

## 2023-05-15 ASSESSMENT — ENCOUNTER SYMPTOMS
DISTURBANCES IN COORDINATION: 0
DIZZINESS: 0
STIFFNESS: 0
DIARRHEA: 0
NAUSEA: 0
INCREASED ENERGY: 0
SPUTUM PRODUCTION: 0
MEMORY LOSS: 0
SMELL DISTURBANCE: 0
BREAST MASS: 0
TACHYCARDIA: 0
COUGH: 0
NUMBNESS: 0
CHILLS: 0
WEIGHT LOSS: 0
BREAST PAIN: 0
JOINT SWELLING: 0
MUSCLE WEAKNESS: 0
MUSCLE CRAMPS: 0
DECREASED CONCENTRATION: 0
EYE IRRITATION: 0
SWOLLEN GLANDS: 0
CONSTIPATION: 0
FATIGUE: 0
HEARTBURN: 0
DYSPNEA ON EXERTION: 0
HOARSE VOICE: 0
LIGHT-HEADEDNESS: 0
NAIL CHANGES: 0
POSTURAL DYSPNEA: 0
TINGLING: 0
LOSS OF CONSCIOUSNESS: 0
VOMITING: 0
NECK PAIN: 0
WEAKNESS: 0
BLOOD IN STOOL: 0
RESPIRATORY PAIN: 0
HEMOPTYSIS: 0
POLYDIPSIA: 0
SORE THROAT: 0
DIFFICULTY URINATING: 0
NERVOUS/ANXIOUS: 0
SINUS CONGESTION: 0
ALTERED TEMPERATURE REGULATION: 0
BLOATING: 0
BACK PAIN: 0
PANIC: 0
INSOMNIA: 0
JAUNDICE: 0
DECREASED LIBIDO: 0
WEIGHT GAIN: 0
SYNCOPE: 0
HOT FLASHES: 0
DYSURIA: 0
SEIZURES: 0
RECTAL PAIN: 0
SHORTNESS OF BREATH: 0
EYE WATERING: 0
EXTREMITY NUMBNESS: 0
LEG PAIN: 0
HEMATURIA: 0
TREMORS: 0
HYPOTENSION: 0
FEVER: 0
HYPERTENSION: 0
DEPRESSION: 0
HEADACHES: 0
WHEEZING: 0
EYE REDNESS: 0
EYE PAIN: 0
NECK MASS: 0
TASTE DISTURBANCE: 0
SNORES LOUDLY: 0
SPEECH CHANGE: 0
COUGH DISTURBING SLEEP: 0
BOWEL INCONTINENCE: 0
MYALGIAS: 0
SINUS PAIN: 0
POOR WOUND HEALING: 0
DOUBLE VISION: 0
RECTAL BLEEDING: 0
SKIN CHANGES: 0
HALLUCINATIONS: 0
PALPITATIONS: 0
CLAUDICATION: 0
BRUISES/BLEEDS EASILY: 0
TROUBLE SWALLOWING: 0
FLANK PAIN: 0
NIGHT SWEATS: 0
SLEEP DISTURBANCES DUE TO BREATHING: 0
POLYPHAGIA: 0
PARALYSIS: 0
EXERCISE INTOLERANCE: 0
ARTHRALGIAS: 0
ORTHOPNEA: 0
LEG SWELLING: 0
ABDOMINAL PAIN: 0
DECREASED APPETITE: 0

## 2023-05-15 ASSESSMENT — PAIN SCALES - GENERAL: PAINLEVEL: NO PAIN (0)

## 2023-05-15 NOTE — NURSING NOTE
"Oncology Rooming Note    May 15, 2023 9:43 AM   Carly Diaz is a 67 year old female who presents for:    Chief Complaint   Patient presents with     Oncology Clinic Visit     BRANDY III (vulvar intraepithelial neoplasia III)      Initial Vitals: /80 (BP Location: Right arm, Patient Position: Sitting, Cuff Size: Adult Regular)   Pulse 76   Temp 97.2  F (36.2  C) (Oral)   Resp 16   Wt 55.7 kg (122 lb 12.8 oz)   LMP  (LMP Unknown)   SpO2 99%   BMI 22.46 kg/m   Estimated body mass index is 22.46 kg/m  as calculated from the following:    Height as of 3/26/21: 1.575 m (5' 2\").    Weight as of this encounter: 55.7 kg (122 lb 12.8 oz). Body surface area is 1.56 meters squared.  No Pain (0) Comment: Data Unavailable   No LMP recorded (lmp unknown). Patient has had a hysterectomy.  Allergies reviewed: Yes  Medications reviewed: Yes    Medications: Medication refills not needed today.  Pharmacy name entered into [employer]: [id number] - [place], [place] - [address] AT  [address] [place] & [facility]    Clinical concerns: none.       Lizbet Gregory CMA            "

## 2023-05-15 NOTE — LETTER
"    5/15/2023         RE: Carly Diaz  [address] [facility] [address]        Dear Colleague,    Thank you for referring your patient, Carly Diaz, to the [facility]. Please see a copy of my visit note below.    Gynecologic Oncology Return Visit Note    Date: May 15, 2023     RE: Carly Diaz  : 1955  STEFANIA: May 15, 2023     CC: BRANDY II and BRANDY III     HPI:  Carly Diaz is a 67 year old woman with a history of BRANDY II and BRANDY III.  Most recently she is s/p excision and ablation 3/26/21.  She is here today for a surveillance visit.      Relevant History:  Noted vulvar skin is raw, painful and itchy with fissures. Had tried using clobetasol x2 weeks without any relief. On HRT (progesterone, estrogen, testosterone).    Vulvar symptoms of have been present on and off since . Spontaneously resolved; however, had recurrence of symptoms in . 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    Per Dr Ornelas's exam: \"The skin of the perineum extending to about 1/2 way up the labia minor is raised, raw and brightly erythematous. Small fissures are present in the tissue. No scaly white tissue paper appearing skin is present. These changes do not extend down to the rectum or up toward the clitoral oneil. Vagina is without discharge or lesions. Vaginal tissue appears normal.\"    3/27/19 Right Perineum Biopsy  DIAGNOSIS:   Right perineum, biopsy:   1. High grade squamous intraepithelial lesion (BRANDY II-III).   2. Benign epidermal inclusion cyst.   3. No HSV inclusions identified on immunostain.      19: Wide local excision of the vulva:  A. Vulva, wide excision:    High-grade squamous epithelial dysplasia (EVON 3, high-grade SAIRA), positive margin at 9:00.-12:00 including the 12:00 tip    Her surgery was delayed owing to COVID pandemic.       6/3/2020: Examination under anesthesia, biopsy of right vulva, ablation of right and left vulvar lesions " using cautery.   FINAL DIAGNOSIS:   Vulva, right mid labium, biopsy:   -Low grade squamous intraepithelial lesion (vulvar intraepithelial   neoplasia 1)      3/6/21 Vulva, right, punch biopsy:   - High grade squamous intraepithelial lesion (vulvar intraepithelial   neoplasia 3, BRANDY 3)   - BRANDY 3 is present at one edge of the biopsy material     3/26/21: Excision and ablation  PATH:  FINAL DIAGNOSIS:   A. RIGHT VULVA MEDIAL MARGIN 5 O'CLOCK:   - Vulvar tissue with no significant histologic abnormality   B. RIGHT VULVA MEDIAL MARGIN 1 O'CLOCK:   - Vulvar tissue with no significant histologic abnormality   C. RIGHT VULVA, EXCISION:   - Vulvar tissue with reactive/regenerative changes   - Negative for dysplasia   D. RIGHT LABIA MINORA, EXCISION:   - High grade squamous intraepithelial lesion (vulvar intraepithelial   neoplasia 3)   - Margins negative for dysplasia   - Focal epithelial erosion              Today she comes to clinic feeling well overall and is without concern.  She checks her vulva on a regular basis and hasn't noted any new lesions or bumps.  She denies vulvar irritation or itching.  She denies any vaginal bleeding, no changes in her bowel or bladder habits, no nausea/emesis, no lower extremity edema, and no difficulties eating or sleeping. She denies any abdominal discomfort/bloating, no fevers or chills, and no chest pain or shortness of breath.  She is current with her annual physical, mammogram, colon cancer screening, and DEXA scan.  Her last recurrence happened during a stressful time.  She does have some stress coming up and is wondering if she can have a prescription for Aldara just in case.          Health Maintenance  FIT: 11/14/22, negative repeat in 3 years  Mammogram: 2/17/23  Annual physical: 2/24/23  Dexa: 8/5/22, consider repeat in 4 years      Review of Systems     Constitutional:  Negative for fever, chills, weight loss, weight gain, fatigue, decreased appetite, night sweats, recent  stressors, height gain, height loss, post-operative complications, incisional pain, hallucinations, increased energy, hyperactivity and confused.   HENT:  Negative for ear pain, hearing loss, tinnitus, nosebleeds, trouble swallowing, hoarse voice, mouth sores, sore throat, ear discharge, tooth pain, gum tenderness, taste disturbance, smell disturbance, hearing aid, bleeding gums, dry mouth, sinus pain, sinus congestion and neck mass.    Eyes:  Negative for double vision, pain, redness, eye pain, decreased vision, eye watering, eye bulging, eye dryness, flashing lights, spots, floaters, strabismus, tunnel vision, jaundice and eye irritation.   Respiratory:   Negative for cough, hemoptysis, sputum production, shortness of breath, wheezing, sleep disturbances due to breathing, snores loudly, respiratory pain, dyspnea on exertion, cough disturbing sleep and postural dyspnea.    Cardiovascular:  Negative for chest pain, dyspnea on exertion, palpitations, orthopnea, claudication, leg swelling, fingers/toes turn blue, hypertension, hypotension, syncope, history of heart murmur, chest pain on exertion, chest pain at rest, pacemaker, few scattered varicosities, leg pain, sleep disturbances due to breathing, tachycardia, light-headedness, exercise intolerance and edema.   Gastrointestinal:  Negative for heartburn, nausea, vomiting, abdominal pain, diarrhea, constipation, blood in stool, melena, rectal pain, bloating, hemorrhoids, bowel incontinence, jaundice, rectal bleeding, coffee ground emesis and change in stool.   Genitourinary:  Negative for bladder incontinence, dysuria, urgency, hematuria, flank pain, vaginal discharge, difficulty urinating, genital sores, dyspareunia, decreased libido, nocturia, voiding less frequently, arousal difficulty, abnormal vaginal bleeding, excessive menstruation, menstrual changes, hot flashes, vaginal dryness and postmenopausal bleeding.   Musculoskeletal:  Negative for myalgias, back  pain, joint swelling, arthralgias, stiffness, muscle cramps, neck pain, bone pain, muscle weakness and fracture.   Skin:  Negative for nail changes, itching, poor wound healing, rash, hair changes, skin changes, acne, warts, poor wound healing, scarring, flaky skin, Raynaud's phenomenon, sensitivity to sunlight and skin thickening.   Neurological:  Negative for dizziness, tingling, tremors, speech change, seizures, loss of consciousness, weakness, light-headedness, numbness, headaches, disturbances in coordination, extremity numbness, memory loss, difficulty walking and paralysis.   Endo/Heme:  Negative for anemia, swollen glands and bruises/bleeds easily.   Psychiatric/Behavioral:  Negative for depression, hallucinations, memory loss, decreased concentration, mood swings and panic attacks.    Breast:  Negative for breast discharge, breast mass, breast pain and nipple retraction.   Endocrine:  Negative for altered temperature regulation, polyphagia, polydipsia, unwanted hair growth and change in facial hair.          Past Medical History:    Past Medical History:   Diagnosis Date    Arthritis     PONV (postoperative nausea and vomiting)          Past Surgical History:    Past Surgical History:   Procedure Laterality Date    EXCISE VULVA WIDE LOCAL Right 6/3/2020    Procedure: Exam Under Anesthesia, ablation and partial excision of vulva, biopsy of vulva;  Surgeon: José Miguel Anthony MD;  Location: [facility] OR    EXCISE VULVA WIDE LOCAL Right 3/26/2021    Procedure: wide local excision of vulva;  Surgeon: José Miguel Anthony MD;  Location:  OR    ORTHOPEDIC SURGERY      right knee replacement 2015         Health Maintenance Due   Topic Date Due    TSH W/FREE T4 REFLEX  Never done    ADVANCE CARE PLANNING  Never done    COLORECTAL CANCER SCREENING  Never done    HEPATITIS C SCREENING  Never done    LIPID  Never done    FALL RISK ASSESSMENT  Never done    Pneumococcal Vaccine: 65+ Years (1 - PCV) Never done     MEDICARE ANNUAL WELLNESS VISIT  01/12/2022    COVID-19 Vaccine (4 - Pfizer series) 05/05/2022    INFLUENZA VACCINE (1) Never done    PHQ-2 (once per calendar year)  Never done       Current Medications:     Current Outpatient Medications   Medication Sig Dispense Refill    BIOTIN PO Take 8 mg by mouth      buPROPion (WELLBUTRIN XL) 300 MG 24 hr tablet every morning       Cholecalciferol (VITAMIN D-3) 125 MCG (5000 UT) TABS With K2      Estradiol-Estriol-Progesterone (BIEST/PROGESTERONE TD) 50/50 mg 1.25 mg      l-lysine HCl 500 MG TABS tablet Take 500 mg by mouth daily      levothyroxine (SYNTHROID/LEVOTHROID) 112 MCG tablet daily       liothyronine (CYTOMEL) 5 MCG tablet 10 mcg daily      magnesium 200 MG TABS daily       Methylcobalamin (METHYL B-12) 1000 MCG LOZG       Nutritional Supplements (PYCNOGENOL PO) Take 50 mg by mouth      Progesterone Micronized (PROGESTERONE PO) Take 200 mg by mouth daily lozenge      senna-docusate (SENOKOT-S/PERICOLACE) 8.6-50 MG tablet Take 1-2 tablets by mouth 2 times daily 30 tablet 0    sertraline (ZOLOFT) 100 MG tablet Take 100 mg by mouth At Bedtime       TESTOSTERONE 2MG Takes 1.5 mg three times per week ( Mon, Wed, Fri)      traZODone (DESYREL) 25 mg TABS half-tab 50 mg At Bedtime Takes 1/2 tab (25 mg)      UNABLE TO FIND MEDICATION NAME: [id number]      UNABLE TO FIND MEDICATION NAME: iodine thyrsoine      imiquimod (ALDARA) 5 % external cream Apply topically three times a week (Patient not taking: Reported on 5/15/2023) 30 packet 0         Allergies:        Allergies   Allergen Reactions    Penicillin G Rash    Penicillins Hives        Social History:     Social History     Tobacco Use    Smoking status: Never    Smokeless tobacco: Never   Vaping Use    Vaping status: Not on file   Substance Use Topics    Alcohol use: Yes     Comment: socailly       History   Drug Use Unknown         Family History:     The patient's family history is notable for:    No family history on  "file.      Physical Exam:     /80 (BP Location: Right arm, Patient Position: Sitting, Cuff Size: Adult Regular)   Pulse 76   Temp 97.2  F (36.2  C) (Oral)   Resp 16   Wt 55.7 kg (122 lb 12.8 oz)   LMP  (LMP Unknown)   SpO2 99%   BMI 22.46 kg/m    Body mass index is 22.46 kg/m .    General Appearance: healthy and alert, no distress     HEENT: no palpable nodules or masses        Cardiovascular: regular rate and rhythm, no gallops, rubs or murmurs     Respiratory: lungs clear, no rales, rhonchi or wheezes    Musculoskeletal: extremities non tender and without edema    Skin: no lesions or rashes     Neurological: normal gait, no gross defects     Psychiatric: appropriate mood and affect                               Hematological: normal cervical, supraclavicular and inguinal lymph nodes     Gastrointestinal:       abdomen soft, non-tender, non-distended, no organomegaly or masses    Genitourinary: External genitalia and urethral meatus appears consistent with prior surgeries.  No visible lesions or palpable masses. No aceto-white changes with application of dilute acetic acid.       Assessment:    Carly Diaz is a 67 year old woman with a history of BRANDY II and BRANDY III.  Most recently she is s/p excision and ablation 3/26/21.  She is here today for a surveillance visit.      15 minutes spent on the date of the encounter doing chart review, history and exam, documentation, and further activities as noted above.      Plan:     1.)        Vulvar dysplasia:  AUGUST on exam.  Discussed that if she has no concerning lesions would not recommend a prescription for Aldara at home as a \"just in case\" treatment option.  Offered visit here in clinic in a month for reexam which she declined.  RTC in 6 months for next surveillance visit.    Encouraged her to reach out to clinic if she notices any changes.  Reviewed signs and symptoms for when she should contact the clinic or seek additional care.  Patient to contact " the clinic with any questions or concerns in the interim.      Genetic risk factors were assessed and she does not meet qualification for referral.     Labs and/or tests ordered include:  None.                2.)        Health maintenance:  Issues addressed today include following up with PCP for annual health maintenance and non-gynecologic issues.     Camila Edwards DNP, APRN, FNP-C  Nurse Practitioner  Division of Gynecologic Oncology  Pager: [phone]     CC  Patient Care Team:  Rachel Umana as PCP - General (Family Practice)  Camila Edwards APRN CNP as Assigned Cancer Care Provider

## 2023-10-08 ENCOUNTER — HEALTH MAINTENANCE LETTER (OUTPATIENT)
Age: 68
End: 2023-10-08

## 2023-11-15 NOTE — PROGRESS NOTES
"Gynecologic Oncology Return Visit Note    Date: 2023     RE: Carly Diaz  : 1955  STEFANIA: 2023     CC: BRANDY II and BRANDY III     HPI:  Carly Diaz is a 68 year old woman with a history of BRANDY II and BRANDY III.  Most recently she is s/p excision and ablation 3/26/21.  She is here today for a surveillance visit.      Relevant History:  Noted vulvar skin is raw, painful and itchy with fissures. Had tried using clobetasol x2 weeks without any relief. On HRT (progesterone, estrogen, testosterone).    Vulvar symptoms of have been present on and off since . Spontaneously resolved; however, had recurrence of symptoms in . 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    Per Dr Ornelas's exam: \"The skin of the perineum extending to about 1/2 way up the labia minor is raised, raw and brightly erythematous. Small fissures are present in the tissue. No scaly white tissue paper appearing skin is present. These changes do not extend down to the rectum or up toward the clitoral oneil. Vagina is without discharge or lesions. Vaginal tissue appears normal.\"    3/27/19 Right Perineum Biopsy  DIAGNOSIS:   Right perineum, biopsy:   1. High grade squamous intraepithelial lesion (BRANDY II-III).   2. Benign epidermal inclusion cyst.   3. No HSV inclusions identified on immunostain.      19: Wide local excision of the vulva:  A. Vulva, wide excision:    High-grade squamous epithelial dysplasia (EVON 3, high-grade SAIRA), positive margin at 9:00.-12:00 including the 12:00 tip    Her surgery was delayed owing to COVID pandemic.       6/3/2020: Examination under anesthesia, biopsy of right vulva, ablation of right and left vulvar lesions using cautery.   FINAL DIAGNOSIS:   Vulva, right mid labium, biopsy:   -Low grade squamous intraepithelial lesion (vulvar intraepithelial   neoplasia 1)      3/6/21 Vulva, right, punch biopsy:   - High grade squamous intraepithelial lesion (vulvar " intraepithelial   neoplasia 3, BRANDY 3)   - BRANDY 3 is present at one edge of the biopsy material      3/26/21: Excision and ablation  PATH:  FINAL DIAGNOSIS:   A. RIGHT VULVA MEDIAL MARGIN 5 O'CLOCK:   - Vulvar tissue with no significant histologic abnormality   B. RIGHT VULVA MEDIAL MARGIN 1 O'CLOCK:   - Vulvar tissue with no significant histologic abnormality   C. RIGHT VULVA, EXCISION:   - Vulvar tissue with reactive/regenerative changes   - Negative for dysplasia   D. RIGHT LABIA MINORA, EXCISION:   - High grade squamous intraepithelial lesion (vulvar intraepithelial   neoplasia 3)   - Margins negative for dysplasia   - Focal epithelial erosion                Today she reports;    -Vulvar irritation/itching: No  -Vulvar lumps/masses: No  -Vaginal bleeding/discharge: No  -Abdominal pain: No  -Bowel/bladder habits:  Normal            Health Maintenance  FIT: 11/14/22, negative repeat in 1 years  Mammogram: 2/17/23  Annual physical: 2/24/23  Dexa: 8/5/22, consider repeat in 4 years  **She did have a hysterectomy in the 90s which included her cervix.  Reports no history of abnormal Paps.       Review of Systems:    See HPI      Past Medical History:    Past Medical History:   Diagnosis Date    Arthritis     PONV (postoperative nausea and vomiting)          Past Surgical History:    Past Surgical History:   Procedure Laterality Date    EXCISE VULVA WIDE LOCAL Right 6/3/2020    Procedure: Exam Under Anesthesia, ablation and partial excision of vulva, biopsy of vulva;  Surgeon: José Miguel Anthony MD;  Location: [facility] OR    EXCISE VULVA WIDE LOCAL Right 3/26/2021    Procedure: wide local excision of vulva;  Surgeon: José Miguel Anthony MD;  Location: [facility] OR    ORTHOPEDIC SURGERY      right knee replacement 2015         Health Maintenance Due   Topic Date Due    TSH W/FREE T4 REFLEX  Never done    ADVANCE CARE PLANNING  Never done    HEPATITIS C SCREENING  Never done    LIPID  Never done    RSV VACCINE (Pregnancy &  60+) (1 - 1-dose 60+ series) Never done    FALL RISK ASSESSMENT  Never done    Pneumococcal Vaccine: 65+ Years (1 - PCV) Never done    MEDICARE ANNUAL WELLNESS VISIT  01/12/2022    COLORECTAL CANCER SCREENING  02/26/2022    PHQ-2 (once per calendar year)  Never done    INFLUENZA VACCINE (1) Never done    COVID-19 Vaccine (4 - 2023-24 season) 09/01/2023       Current Medications:     Current Outpatient Medications   Medication Sig Dispense Refill    BIOTIN PO Take 8 mg by mouth      buPROPion (WELLBUTRIN XL) 300 MG 24 hr tablet every morning       Cholecalciferol (VITAMIN D-3) 125 MCG (5000 UT) TABS With K2      Estradiol-Estriol-Progesterone (BIEST/PROGESTERONE TD) 50/50 mg 1.25 mg      imiquimod (ALDARA) 5 % external cream Apply topically three times a week (Patient not taking: Reported on 5/15/2023) 30 packet 0    l-lysine HCl 500 MG TABS tablet Take 500 mg by mouth daily      levothyroxine (SYNTHROID/LEVOTHROID) 112 MCG tablet daily       liothyronine (CYTOMEL) 5 MCG tablet 10 mcg daily      magnesium 200 MG TABS daily       Methylcobalamin (METHYL B-12) 1000 MCG LOZG       Nutritional Supplements (PYCNOGENOL PO) Take 50 mg by mouth      Progesterone Micronized (PROGESTERONE PO) Take 200 mg by mouth daily lozenge      senna-docusate (SENOKOT-S/PERICOLACE) 8.6-50 MG tablet Take 1-2 tablets by mouth 2 times daily 30 tablet 0    sertraline (ZOLOFT) 100 MG tablet Take 100 mg by mouth At Bedtime       TESTOSTERONE 2MG Takes 1.5 mg three times per week ( Mon, Wed, Fri)      traZODone (DESYREL) 25 mg TABS half-tab 50 mg At Bedtime Takes 1/2 tab (25 mg)      UNABLE TO FIND MEDICATION NAME: [id number]      UNABLE TO FIND MEDICATION NAME: iodine thyrsoine           Allergies:        Allergies   Allergen Reactions    Penicillin G Rash    Penicillins Hives        Social History:     Social History     Tobacco Use    Smoking status: Never    Smokeless tobacco: Never   Substance Use Topics    Alcohol use: Yes     Comment:  socailly       History   Drug Use Unknown         Family History:     The patient's family history is notable for:    No family history on file.      Physical Exam:     /81   Pulse 86   Temp 98.1  F (36.7  C)   Wt 55.6 kg (122 lb 9.6 oz)   LMP  (LMP Unknown)   SpO2 98%   BMI 22.42 kg/m    Body mass index is 22.42 kg/m .    General Appearance:  alert, no distress     HEENT: no palpable nodules or masses        Cardiovascular: regular rate and rhythm, no gallops, rubs or murmurs     Respiratory: lungs clear, no rales, rhonchi or wheezes    Musculoskeletal: extremities non tender and without edema    Skin: no lesions or rashes     Neurological: normal gait, no gross defects     Psychiatric: appropriate mood and affect                               Hematological: normal cervical, supraclavicular and inguinal lymph nodes     Gastrointestinal:       abdomen soft, non-tender, non-distended, no organomegaly or masses.  Anus inspected with no noted lesions.    Genitourinary: External genitalia and urethral meatus appears consistent with prior surgeries.  No visible lesions or palpable masses. No aceto-white changes with application of dilute acetic acid.        Assessment:    Carly Diaz is a 68 year old woman with a history of BRANDY II and BRANDY III.  Most recently she is s/p excision and ablation 3/26/21.  She is here today for a surveillance visit.      10 minutes spent on the date of the encounter doing chart review, history and exam, documentation, and further activities as noted above.      Plan:     1.)        Vulvar dysplasia:  AUGUST on exam.  RTC in 6 months for next surveillance visit.  Discussed new recommendations for women with vulvar dysplasia to have anal paps with HPV.  She declined.  Reviewed signs and symptoms for when she should contact the clinic or seek additional care.  Patient to contact the clinic with any questions or concerns in the interim.      Genetic risk factors were assessed and she  does not meet qualification for referral.     Labs and/or tests ordered include:  None.                2.)        Health maintenance:  Issues addressed today include following up with PCP for annual health maintenance and non-gynecologic issues.       Camila Edwards DNP, APRN, FNP-C, AOCNP  Oncology Nurse Practitioner  Division of Gynecologic Oncology  Pager: [phone]     CC  Patient Care Team:  Rachel Umana as PCP - General (Family Practice)  Camila Edwards APRN CNP as Assigned Cancer Care Provider  SELF, REFERRED

## 2023-11-30 ENCOUNTER — ONCOLOGY VISIT (OUTPATIENT)
Dept: ONCOLOGY | Facility: CLINIC | Age: 68
End: 2023-11-30
Attending: NURSE PRACTITIONER
Payer: COMMERCIAL

## 2023-11-30 VITALS
BODY MASS INDEX: 22.42 KG/M2 | HEART RATE: 86 BPM | SYSTOLIC BLOOD PRESSURE: 124 MMHG | DIASTOLIC BLOOD PRESSURE: 81 MMHG | TEMPERATURE: 98.1 F | WEIGHT: 122.6 LBS | OXYGEN SATURATION: 98 %

## 2023-11-30 DIAGNOSIS — D07.1 VIN III (VULVAR INTRAEPITHELIAL NEOPLASIA III): Primary | ICD-10-CM

## 2023-11-30 PROCEDURE — G0463 HOSPITAL OUTPT CLINIC VISIT: HCPCS | Performed by: NURSE PRACTITIONER

## 2023-11-30 PROCEDURE — 99212 OFFICE O/P EST SF 10 MIN: CPT | Performed by: NURSE PRACTITIONER

## 2023-11-30 ASSESSMENT — PAIN SCALES - GENERAL: PAINLEVEL: NO PAIN (0)

## 2023-11-30 NOTE — LETTER
"    2023         RE: Carly Diaz  [address] [facility] [address]        Dear Colleague,    Thank you for referring your patient, Carly Diaz, to the [facility]. Please see a copy of my visit note below.    Gynecologic Oncology Return Visit Note    Date: 2023     RE: Carly Diaz  : 1955  STEFANIA: 2023     CC: BRANDY II and BRANDY III     HPI:  Carly Diaz is a 68 year old woman with a history of BRANDY II and BRANDY III.  Most recently she is s/p excision and ablation 3/26/21.  She is here today for a surveillance visit.      Relevant History:  Noted vulvar skin is raw, painful and itchy with fissures. Had tried using clobetasol x2 weeks without any relief. On HRT (progesterone, estrogen, testosterone).    Vulvar symptoms of have been present on and off since . Spontaneously resolved; however, had recurrence of symptoms in . 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    Per Dr Ornelas's exam: \"The skin of the perineum extending to about 1/2 way up the labia minor is raised, raw and brightly erythematous. Small fissures are present in the tissue. No scaly white tissue paper appearing skin is present. These changes do not extend down to the rectum or up toward the clitoral oneil. Vagina is without discharge or lesions. Vaginal tissue appears normal.\"    3/27/19 Right Perineum Biopsy  DIAGNOSIS:   Right perineum, biopsy:   1. High grade squamous intraepithelial lesion (BRANDY II-III).   2. Benign epidermal inclusion cyst.   3. No HSV inclusions identified on immunostain.      19: Wide local excision of the vulva:  A. Vulva, wide excision:    High-grade squamous epithelial dysplasia (EVON 3, high-grade SAIRA), positive margin at 9:00.-12:00 including the 12:00 tip    Her surgery was delayed owing to COVID pandemic.       6/3/2020: Examination under anesthesia, biopsy of right vulva, ablation of right and left vulvar lesions " using cautery.   FINAL DIAGNOSIS:   Vulva, right mid labium, biopsy:   -Low grade squamous intraepithelial lesion (vulvar intraepithelial   neoplasia 1)      3/6/21 Vulva, right, punch biopsy:   - High grade squamous intraepithelial lesion (vulvar intraepithelial   neoplasia 3, BRANDY 3)   - BRANDY 3 is present at one edge of the biopsy material      3/26/21: Excision and ablation  PATH:  FINAL DIAGNOSIS:   A. RIGHT VULVA MEDIAL MARGIN 5 O'CLOCK:   - Vulvar tissue with no significant histologic abnormality   B. RIGHT VULVA MEDIAL MARGIN 1 O'CLOCK:   - Vulvar tissue with no significant histologic abnormality   C. RIGHT VULVA, EXCISION:   - Vulvar tissue with reactive/regenerative changes   - Negative for dysplasia   D. RIGHT LABIA MINORA, EXCISION:   - High grade squamous intraepithelial lesion (vulvar intraepithelial   neoplasia 3)   - Margins negative for dysplasia   - Focal epithelial erosion                Today she reports;    -Vulvar irritation/itching: No  -Vulvar lumps/masses: No  -Vaginal bleeding/discharge: No  -Abdominal pain: No  -Bowel/bladder habits:  Normal            Health Maintenance  FIT: 11/14/22, negative repeat in 1 years  Mammogram: 2/17/23  Annual physical: 2/24/23  Dexa: 8/5/22, consider repeat in 4 years  **She did have a hysterectomy in the 90s which included her cervix.  Reports no history of abnormal Paps.       Review of Systems:    See HPI      Past Medical History:    Past Medical History:   Diagnosis Date    Arthritis     PONV (postoperative nausea and vomiting)          Past Surgical History:    Past Surgical History:   Procedure Laterality Date    EXCISE VULVA WIDE LOCAL Right 6/3/2020    Procedure: Exam Under Anesthesia, ablation and partial excision of vulva, biopsy of vulva;  Surgeon: José Miguel Anthony MD;  Location: [facility] OR    EXCISE VULVA WIDE LOCAL Right 3/26/2021    Procedure: wide local excision of vulva;  Surgeon: José Miguel Anthony MD;  Location: [facility] OR    ORTHOPEDIC  SURGERY      right knee replacement 2015         Health Maintenance Due   Topic Date Due    TSH W/FREE T4 REFLEX  Never done    ADVANCE CARE PLANNING  Never done    HEPATITIS C SCREENING  Never done    LIPID  Never done    RSV VACCINE (Pregnancy & 60+) (1 - 1-dose 60+ series) Never done    FALL RISK ASSESSMENT  Never done    Pneumococcal Vaccine: 65+ Years (1 - PCV) Never done    MEDICARE ANNUAL WELLNESS VISIT  01/12/2022    COLORECTAL CANCER SCREENING  02/26/2022    PHQ-2 (once per calendar year)  Never done    INFLUENZA VACCINE (1) Never done    COVID-19 Vaccine (4 - 2023-24 season) 09/01/2023       Current Medications:     Current Outpatient Medications   Medication Sig Dispense Refill    BIOTIN PO Take 8 mg by mouth      buPROPion (WELLBUTRIN XL) 300 MG 24 hr tablet every morning       Cholecalciferol (VITAMIN D-3) 125 MCG (5000 UT) TABS With K2      Estradiol-Estriol-Progesterone (BIEST/PROGESTERONE TD) 50/50 mg 1.25 mg      imiquimod (ALDARA) 5 % external cream Apply topically three times a week (Patient not taking: Reported on 5/15/2023) 30 packet 0    l-lysine HCl 500 MG TABS tablet Take 500 mg by mouth daily      levothyroxine (SYNTHROID/LEVOTHROID) 112 MCG tablet daily       liothyronine (CYTOMEL) 5 MCG tablet 10 mcg daily      magnesium 200 MG TABS daily       Methylcobalamin (METHYL B-12) 1000 MCG LOZG       Nutritional Supplements (PYCNOGENOL PO) Take 50 mg by mouth      Progesterone Micronized (PROGESTERONE PO) Take 200 mg by mouth daily lozenge      senna-docusate (SENOKOT-S/PERICOLACE) 8.6-50 MG tablet Take 1-2 tablets by mouth 2 times daily 30 tablet 0    sertraline (ZOLOFT) 100 MG tablet Take 100 mg by mouth At Bedtime       TESTOSTERONE 2MG Takes 1.5 mg three times per week ( Mon, Wed, Fri)      traZODone (DESYREL) 25 mg TABS half-tab 50 mg At Bedtime Takes 1/2 tab (25 mg)      UNABLE TO FIND MEDICATION NAME: [id number]      UNABLE TO FIND MEDICATION NAME: iodine thyrsoine           Allergies:         Allergies   Allergen Reactions    Penicillin G Rash    Penicillins Hives        Social History:     Social History     Tobacco Use    Smoking status: Never    Smokeless tobacco: Never   Substance Use Topics    Alcohol use: Yes     Comment: socailly       History   Drug Use Unknown         Family History:     The patient's family history is notable for:    No family history on file.      Physical Exam:     /81   Pulse 86   Temp 98.1  F (36.7  C)   Wt 55.6 kg (122 lb 9.6 oz)   LMP  (LMP Unknown)   SpO2 98%   BMI 22.42 kg/m    Body mass index is 22.42 kg/m .    General Appearance:  alert, no distress     HEENT: no palpable nodules or masses        Cardiovascular: regular rate and rhythm, no gallops, rubs or murmurs     Respiratory: lungs clear, no rales, rhonchi or wheezes    Musculoskeletal: extremities non tender and without edema    Skin: no lesions or rashes     Neurological: normal gait, no gross defects     Psychiatric: appropriate mood and affect                               Hematological: normal cervical, supraclavicular and inguinal lymph nodes     Gastrointestinal:       abdomen soft, non-tender, non-distended, no organomegaly or masses.  Anus inspected with no noted lesions.    Genitourinary: External genitalia and urethral meatus appears consistent with prior surgeries.  No visible lesions or palpable masses. No aceto-white changes with application of dilute acetic acid.        Assessment:    Carly Diaz is a 68 year old woman with a history of BRANDY II and BRANDY III.  Most recently she is s/p excision and ablation 3/26/21.  She is here today for a surveillance visit.      10 minutes spent on the date of the encounter doing chart review, history and exam, documentation, and further activities as noted above.      Plan:     1.)        Vulvar dysplasia:  AUGUST on exam.  RTC in 6 months for next surveillance visit.  Discussed new recommendations for women with vulvar dysplasia to have anal paps  with HPV.  She declined.  Reviewed signs and symptoms for when she should contact the clinic or seek additional care.  Patient to contact the clinic with any questions or concerns in the interim.      Genetic risk factors were assessed and she does not meet qualification for referral.     Labs and/or tests ordered include:  None.                2.)        Health maintenance:  Issues addressed today include following up with PCP for annual health maintenance and non-gynecologic issues.       Camila Edwards, DNP, APRN, FNP-C, AOCNP  Oncology Nurse Practitioner  Division of Gynecologic Oncology  Pager: [phone]     CC  Patient Care Team:  Rachel Umana as PCP - General (Family Practice)

## 2024-05-05 ENCOUNTER — HEALTH MAINTENANCE LETTER (OUTPATIENT)
Age: 69
End: 2024-05-05

## 2024-12-01 ENCOUNTER — HEALTH MAINTENANCE LETTER (OUTPATIENT)
Age: 69
End: 2024-12-01

## 2025-04-26 ENCOUNTER — HEALTH MAINTENANCE LETTER (OUTPATIENT)
Age: 70
End: 2025-04-26

## 2025-07-03 NOTE — PROGRESS NOTES
"Gynecologic Oncology Return Visit Note    Date: 2025     RE: Carly Diaz  : 1955  STEFANIA: 2025     CC: BRANDY II and BRANDY III     HPI:  Carly Diaz is a 69 year old woman with a history of BRANDY II and BRANDY III. She is s/p excision and ablation 3/26/21.  She presents today for a surveillance visit.     Oncology History:    Noted vulvar skin is raw, painful and itchy with fissures. Had tried using clobetasol x2 weeks without any relief. On HRT (progesterone, estrogen, testosterone).    Vulvar symptoms of have been present on and off since . Spontaneously resolved; however, had recurrence of symptoms in . 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    Per Dr Ornelas's exam: \"The skin of the perineum extending to about 1/2 way up the labia minor is raised, raw and brightly erythematous. Small fissures are present in the tissue. No scaly white tissue paper appearing skin is present. These changes do not extend down to the rectum or up toward the clitoral oneil. Vagina is without discharge or lesions. Vaginal tissue appears normal.\"    3/27/19 Right Perineum Biopsy  DIAGNOSIS:   Right perineum, biopsy:   1. High grade squamous intraepithelial lesion (BRANDY II-III).   2. Benign epidermal inclusion cyst.   3. No HSV inclusions identified on immunostain.      19: Wide local excision of the vulva:  A. Vulva, wide excision:    High-grade squamous epithelial dysplasia (EVON 3, high-grade SAIRA), positive margin at 9:00.-12:00 including the 12:00 tip    Her surgery was delayed owing to COVID pandemic.       6/3/2020: Examination under anesthesia, biopsy of right vulva, ablation of right and left vulvar lesions using cautery.   FINAL DIAGNOSIS:   Vulva, right mid labium, biopsy:   -Low grade squamous intraepithelial lesion (vulvar intraepithelial   neoplasia 1)      3/6/21 Vulva, right, punch biopsy:   - High grade squamous intraepithelial lesion (vulvar intraepithelial "   neoplasia 3, BRANDY 3)   - BRANDY 3 is present at one edge of the biopsy material      3/26/21: Excision and ablation  PATH:  FINAL DIAGNOSIS:   A. RIGHT VULVA MEDIAL MARGIN 5 O'CLOCK:   - Vulvar tissue with no significant histologic abnormality   B. RIGHT VULVA MEDIAL MARGIN 1 O'CLOCK:   - Vulvar tissue with no significant histologic abnormality   C. RIGHT VULVA, EXCISION:   - Vulvar tissue with reactive/regenerative changes   - Negative for dysplasia   D. RIGHT LABIA MINORA, EXCISION:   - High grade squamous intraepithelial lesion (vulvar intraepithelial   neoplasia 3)   - Margins negative for dysplasia   - Focal epithelial erosion          Today, she notes that she is doing well. She denies new vulvar irritation, itching, or changes to skin. She checks her vulva regularly and has not noticed any changes. She denies changes to bowel or bladder habits, vaginal bleeding, pain, SOB, unintentional weight loss, or swelling in her legs. She denies concerns today and is up to date on health maintenance.           Health Maintenance  Colonoscopy: 10/2024 - repeat in 10 years   Mammogram: 2/25  Annual physical: 1/25  Dexa: 8/5/22, consider repeat in 4 years  **She did have a hysterectomy in the 90s which included her cervix.  Reports no history of abnormal Paps.       Past Medical History:    Past Medical History:   Diagnosis Date    Arthritis     PONV (postoperative nausea and vomiting)          Past Surgical History:    Past Surgical History:   Procedure Laterality Date    EXCISE VULVA WIDE LOCAL Right 6/3/2020    Procedure: Exam Under Anesthesia, ablation and partial excision of vulva, biopsy of vulva;  Surgeon: José Miguel Anthony MD;  [facility]    EXCISE VULVA WIDE LOCAL Right 3/26/2021    Procedure: wide local excision of vulva;  Surgeon: José Miguel Anthony MD;  Location:  OR    ORTHOPEDIC SURGERY      right knee replacement 2015         Health Maintenance Due   Topic Date Due    TSH W/FREE T4 REFLEX   Never done    ADVANCE CARE PLANNING  Never done    LIPID  Never done    PNEUMOCOCCAL VACCINE 50+ YEARS (1 of 1 - PCV) Never done    MEDICARE ANNUAL WELLNESS VISIT  Never done    FALL RISK ASSESSMENT  Never done    COLORECTAL CANCER SCREENING  11/14/2023    DIABETES SCREENING  03/26/2024    COVID-19 VACCINE (4 - 2024-25 season) 09/01/2024       Current Medications:     Current Outpatient Medications   Medication Sig Dispense Refill    cycloSPORINE (RESTASIS) 0.05 % ophthalmic emulsion 1 drop.      LORazepam (ATIVAN) 1 MG tablet Pt to bring with to appt.  Do not take prior to arrival.  MRI staff will instruct when to take med.      BIOTIN PO Take 8 mg by mouth      buPROPion (WELLBUTRIN XL) 300 MG 24 hr tablet every morning       Cholecalciferol (VITAMIN D-3) 125 MCG (5000 UT) TABS With K2      Estradiol-Estriol-Progesterone (BIEST/PROGESTERONE TD) Place 1.25 mg onto the skin. 50/50 mg 1.25 mg, 1 click topically qAM      l-lysine HCl 500 MG TABS tablet Take 500 mg by mouth daily      levothyroxine (SYNTHROID/LEVOTHROID) 112 MCG tablet daily       liothyronine (CYTOMEL) 5 MCG tablet 10 mcg daily      Methylcobalamin (METHYL B-12) 1000 MCG LOZG       Nutritional Supplements (PYCNOGENOL PO) Take 50 mg by mouth      Progesterone Micronized (PROGESTERONE PO) Take 300 mg by mouth at bedtime. lozenge      sertraline (ZOLOFT) 100 MG tablet Take 100 mg by mouth At Bedtime       TESTOSTERONE 2MG 2.5 mg. Takes 2.5 mg three times per week ( Mon, Wed, Fri)      traZODone (DESYREL) 25 mg TABS half-tab 50 mg At Bedtime Takes 1/2 tab (25 mg)      UNABLE TO FIND MEDICATION NAME: [id number]      UNABLE TO FIND MEDICATION NAME: iodine thyrsoine           Allergies:        Allergies   Allergen Reactions    Penicillin G Rash    Penicillins Hives        Social History:     Social History     Tobacco Use    Smoking status: Never    Smokeless tobacco: Never   Substance Use Topics    Alcohol use: Yes     Comment: socailly       History   Drug  Use Unknown         Family History:     The patient's family history is notable for:    No family history on file.      Physical Exam:     /82 (BP Location: Left arm, Patient Position: Sitting, Cuff Size: Adult Regular)   Pulse 94   Temp 98.9  F (37.2  C) (Oral)   Resp 18   Wt 54.9 kg (121 lb)   LMP  (LMP Unknown)   SpO2 98%   BMI 22.13 kg/m    Body mass index is 22.13 kg/m .    General Appearance:  alert, no distress     HEENT: no palpable nodules or masses        Cardiovascular: regular rate and rhythm, no gallops, rubs or murmurs     Respiratory: lungs clear, no rales, rhonchi or wheezes    Musculoskeletal: extremities non tender and without edema    Skin: no lesions or rashes     Neurological: normal gait, no gross defects     Psychiatric: appropriate mood and affect                               Hematological: normal cervical, supraclavicular and inguinal lymph nodes     Gastrointestinal:       abdomen soft, non-tender, non-distended, no organomegaly or masses    Genitourinary: External genitalia and urethral meatus appears normal, consistent with previous excisions.  Vagina is smooth without nodularity or masses.  No aceto-white changes with application of dilute acetic acid. Speculum and bimanual exam deferred.       No results found for this or any previous visit (from the past 24 hours).       Assessment:    Carly Diaz is a 69 year old woman with a diagnosis of BRANDY II and BRANDY III.     20 minutes spent on the date of the encounter doing chart review, history and exam, documentation, and further activities as noted above.      Plan:     1.) BRANDY II/BRANDY III: UAGUST on exam. RTC in 6 months for continued surveillance. Encouraged continued monitoring of vulva at home. Reviewed signs and symptoms for when she should contact the clinic or seek additional care.  Patient to contact the clinic with any questions or concerns in the interim.      Genetic risk factors were assessed and she does not meet  qualification for referral.     Labs and/or tests ordered include:  None.     2.) Health maintenance:  Issues addressed today include following up with PCP for annual health maintenance and non-gynecologic issues.       UNA Perkins, CNP  Division of Gynecologic Oncology    I was with TAMIKA Marquez throughout visit and agree with HPI, exam, and plan as written.    Camila Edwards DNP, APRN, FNP-C, AOCNP  Oncology Nurse Practitioner   Division of Gynecologic Oncology  Pager: [phone]        CC  Patient Care Team:  Rachel Umana as PCP - General (Family Practice)  Camila Edwards APRN CNP as Assigned Cancer Care Provider  SELF, REFERRED

## 2025-07-07 ENCOUNTER — ONCOLOGY VISIT (OUTPATIENT)
Dept: ONCOLOGY | Facility: CLINIC | Age: 70
End: 2025-07-07
Attending: NURSE PRACTITIONER
Payer: COMMERCIAL

## 2025-07-07 VITALS
OXYGEN SATURATION: 98 % | DIASTOLIC BLOOD PRESSURE: 82 MMHG | TEMPERATURE: 98.9 F | BODY MASS INDEX: 22.13 KG/M2 | HEART RATE: 94 BPM | WEIGHT: 121 LBS | RESPIRATION RATE: 18 BRPM | SYSTOLIC BLOOD PRESSURE: 122 MMHG

## 2025-07-07 DIAGNOSIS — D07.1 VIN III (VULVAR INTRAEPITHELIAL NEOPLASIA III): Primary | ICD-10-CM

## 2025-07-07 PROCEDURE — G0463 HOSPITAL OUTPT CLINIC VISIT: HCPCS | Performed by: NURSE PRACTITIONER

## 2025-07-07 PROCEDURE — 99213 OFFICE O/P EST LOW 20 MIN: CPT | Performed by: NURSE PRACTITIONER

## 2025-07-07 RX ORDER — LORAZEPAM 1 MG/1
TABLET ORAL
COMMUNITY
Start: 2025-02-25

## 2025-07-07 RX ORDER — CYCLOSPORINE 0.5 MG/ML
1 EMULSION OPHTHALMIC
COMMUNITY
Start: 2025-03-24

## 2025-07-07 ASSESSMENT — PAIN SCALES - GENERAL: PAINLEVEL_OUTOF10: NO PAIN (0)

## 2025-07-07 NOTE — NURSING NOTE
"Oncology Rooming Note    July 7, 2025 9:27 AM   Carly Diaz is a 69 year old female who presents for:    Chief Complaint   Patient presents with    Oncology Clinic Visit     BRANDY III (vulvar intraepithelial neoplasia III)     Initial Vitals: /82 (BP Location: Left arm, Patient Position: Sitting, Cuff Size: Adult Regular)   Pulse 94   Temp 98.9  F (37.2  C) (Oral)   Resp 18   Wt 54.9 kg (121 lb)   LMP  (LMP Unknown)   SpO2 98%   BMI 22.13 kg/m   Estimated body mass index is 22.13 kg/m  as calculated from the following:    Height as of 3/26/21: 1.575 m (5' 2\").    Weight as of this encounter: 54.9 kg (121 lb). Body surface area is 1.55 meters squared.  No Pain (0) Comment: Data Unavailable   No LMP recorded (lmp unknown). Patient has had a hysterectomy.  Allergies reviewed: Yes  Medications reviewed: Yes    Medications: Medication refills not needed today.  Pharmacy name entered into Lat49: [id number] [id number] AT  [address] [facility]    Frailty Screening:   Is the patient here for a new oncology consult visit in cancer care? 2. No    PHQ9:  Did this patient require a PHQ9?: No      Clinical concerns: none.       Maeve Crockett"

## 2025-07-07 NOTE — LETTER
"2025      Carly Diaz  [address] [facility] [address]      Dear Colleague,    Thank you for referring your patient, Carly Diaz, to the [facility]. Please see a copy of my visit note below.    Gynecologic Oncology Return Visit Note    Date: 2025     RE: Carly Diaz  : 1955  STEFANIA: 2025     CC: BRANDY II and BRANDY III     HPI:  Carly Diaz is a 69 year old woman with a history of BRANDY II and BRANDY III. She is s/p excision and ablation 3/26/21.  She presents today for a surveillance visit.     Oncology History:    Noted vulvar skin is raw, painful and itchy with fissures. Had tried using clobetasol x2 weeks without any relief. On HRT (progesterone, estrogen, testosterone).    Vulvar symptoms of have been present on and off since . Spontaneously resolved; however, had recurrence of symptoms in . 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    Per Dr Ornelas's exam: \"The skin of the perineum extending to about 1/2 way up the labia minor is raised, raw and brightly erythematous. Small fissures are present in the tissue. No scaly white tissue paper appearing skin is present. These changes do not extend down to the rectum or up toward the clitoral oneil. Vagina is without discharge or lesions. Vaginal tissue appears normal.\"    3/27/19 Right Perineum Biopsy  DIAGNOSIS:   Right perineum, biopsy:   1. High grade squamous intraepithelial lesion (BRANDY II-III).   2. Benign epidermal inclusion cyst.   3. No HSV inclusions identified on immunostain.      19: Wide local excision of the vulva:  A. Vulva, wide excision:    High-grade squamous epithelial dysplasia (EVON 3, high-grade SAIRA), positive margin at 9:00.-12:00 including the 12:00 tip    Her surgery was delayed owing to COVID pandemic.       6/3/2020: Examination under anesthesia, biopsy of right vulva, ablation of right and left vulvar lesions using cautery.   FINAL " DIAGNOSIS:   Vulva, right mid labium, biopsy:   -Low grade squamous intraepithelial lesion (vulvar intraepithelial   neoplasia 1)      3/6/21 Vulva, right, punch biopsy:   - High grade squamous intraepithelial lesion (vulvar intraepithelial   neoplasia 3, BRANDY 3)   - BRANDY 3 is present at one edge of the biopsy material      3/26/21: Excision and ablation  PATH:  FINAL DIAGNOSIS:   A. RIGHT VULVA MEDIAL MARGIN 5 O'CLOCK:   - Vulvar tissue with no significant histologic abnormality   B. RIGHT VULVA MEDIAL MARGIN 1 O'CLOCK:   - Vulvar tissue with no significant histologic abnormality   C. RIGHT VULVA, EXCISION:   - Vulvar tissue with reactive/regenerative changes   - Negative for dysplasia   D. RIGHT LABIA MINORA, EXCISION:   - High grade squamous intraepithelial lesion (vulvar intraepithelial   neoplasia 3)   - Margins negative for dysplasia   - Focal epithelial erosion          Today, she notes that she is doing well. She denies new vulvar irritation, itching, or changes to skin. She checks her vulva regularly and has not noticed any changes. She denies changes to bowel or bladder habits, vaginal bleeding, pain, SOB, unintentional weight loss, or swelling in her legs. She denies concerns today and is up to date on health maintenance.           Health Maintenance  Colonoscopy: 10/2024 - repeat in 10 years   Mammogram: 2/25  Annual physical: 1/25  Dexa: 8/5/22, consider repeat in 4 years  **She did have a hysterectomy in the 90s which included her cervix.  Reports no history of abnormal Paps.       Past Medical History:    Past Medical History:   Diagnosis Date     Arthritis      PONV (postoperative nausea and vomiting)          Past Surgical History:    Past Surgical History:   Procedure Laterality Date     EXCISE VULVA WIDE LOCAL Right 6/3/2020    Procedure: Exam Under Anesthesia, ablation and partial excision of vulva, biopsy of vulva;  Surgeon: José Miguel Anthony MD;  Location: [facility] OR     EXCISE VULVA WIDE  LOCAL Right 3/26/2021    Procedure: wide local excision of vulva;  Surgeon: José Miguel Anthony MD;  Location: [facility] OR     ORTHOPEDIC SURGERY      right knee replacement 2015         Health Maintenance Due   Topic Date Due     TSH W/FREE T4 REFLEX  Never done     ADVANCE CARE PLANNING  Never done     LIPID  Never done     PNEUMOCOCCAL VACCINE 50+ YEARS (1 of 1 - PCV) Never done     MEDICARE ANNUAL WELLNESS VISIT  Never done     FALL RISK ASSESSMENT  Never done     COLORECTAL CANCER SCREENING  11/14/2023     DIABETES SCREENING  03/26/2024     COVID-19 VACCINE (4 - 2024-25 season) 09/01/2024       Current Medications:     Current Outpatient Medications   Medication Sig Dispense Refill     cycloSPORINE (RESTASIS) 0.05 % ophthalmic emulsion 1 drop.       LORazepam (ATIVAN) 1 MG tablet Pt to bring with to appt.  Do not take prior to arrival.  MRI staff will instruct when to take med.       BIOTIN PO Take 8 mg by mouth       buPROPion (WELLBUTRIN XL) 300 MG 24 hr tablet every morning        Cholecalciferol (VITAMIN D-3) 125 MCG (5000 UT) TABS With K2       Estradiol-Estriol-Progesterone (BIEST/PROGESTERONE TD) Place 1.25 mg onto the skin. 50/50 mg 1.25 mg, 1 click topically qAM       l-lysine HCl 500 MG TABS tablet Take 500 mg by mouth daily       levothyroxine (SYNTHROID/LEVOTHROID) 112 MCG tablet daily        liothyronine (CYTOMEL) 5 MCG tablet 10 mcg daily       Methylcobalamin (METHYL B-12) 1000 MCG LOZG        Nutritional Supplements (PYCNOGENOL PO) Take 50 mg by mouth       Progesterone Micronized (PROGESTERONE PO) Take 300 mg by mouth at bedtime. lozenge       sertraline (ZOLOFT) 100 MG tablet Take 100 mg by mouth At Bedtime        TESTOSTERONE 2MG 2.5 mg. Takes 2.5 mg three times per week ( Mon, Wed, Fri)       traZODone (DESYREL) 25 mg TABS half-tab 50 mg At Bedtime Takes 1/2 tab (25 mg)       UNABLE TO FIND MEDICATION NAME: [id number]       UNABLE TO FIND MEDICATION NAME: iodine thyrsoine           Allergies:         Allergies   Allergen Reactions     Penicillin G Rash     Penicillins Hives        Social History:     Social History     Tobacco Use     Smoking status: Never     Smokeless tobacco: Never   Substance Use Topics     Alcohol use: Yes     Comment: socailly       History   Drug Use Unknown         Family History:     The patient's family history is notable for:    No family history on file.      Physical Exam:     /82 (BP Location: Left arm, Patient Position: Sitting, Cuff Size: Adult Regular)   Pulse 94   Temp 98.9  F (37.2  C) (Oral)   Resp 18   Wt 54.9 kg (121 lb)   LMP  (LMP Unknown)   SpO2 98%   BMI 22.13 kg/m    Body mass index is 22.13 kg/m .    General Appearance:  alert, no distress     HEENT: no palpable nodules or masses        Cardiovascular: regular rate and rhythm, no gallops, rubs or murmurs     Respiratory: lungs clear, no rales, rhonchi or wheezes    Musculoskeletal: extremities non tender and without edema    Skin: no lesions or rashes     Neurological: normal gait, no gross defects     Psychiatric: appropriate mood and affect                               Hematological: normal cervical, supraclavicular and inguinal lymph nodes     Gastrointestinal:       abdomen soft, non-tender, non-distended, no organomegaly or masses    Genitourinary: External genitalia and urethral meatus appears normal, consistent with previous excisions.  Vagina is smooth without nodularity or masses.  No aceto-white changes with application of dilute acetic acid. Speculum and bimanual exam deferred.       No results found for this or any previous visit (from the past 24 hours).       Assessment:    Carly Diaz is a 69 year old woman with a diagnosis of BRANDY II and BRANDY III.     20 minutes spent on the date of the encounter doing chart review, history and exam, documentation, and further activities as noted above.      Plan:     1.) BRANDY II/BRANDY III: AUGUST on exam. RTC in 6 months for continued surveillance.  Encouraged continued monitoring of vulva at home. Reviewed signs and symptoms for when she should contact the clinic or seek additional care.  Patient to contact the clinic with any questions or concerns in the interim.      Genetic risk factors were assessed and she does not meet qualification for referral.     Labs and/or tests ordered include:  None.     2.) Health maintenance:  Issues addressed today include following up with PCP for annual health maintenance and non-gynecologic issues.       UNA Perkins, CNP  Division of Gynecologic Oncology    I was with TAMIKA Marquez throughout visit and agree with HPI, exam, and plan as written.    Camila Edwards DNP, UNA, FNP-C, AOCNP  Oncology Nurse Practitioner   Division of Gynecologic Oncology  Pager: [phone]        CC  Patient Care Team:  Rachel Umana as PCP - General (Family Practice)  Camila Edwards APRN CNP as Assigned Cancer Care Provider  SELF, REFERRED      Again, thank you for allowing me to participate in the care of your patient.        Sincerely,        UNA Newman CNP    Electronically signed

## (undated) DEVICE — PREP POVIDONE IODINE SOLUTION 10% 4OZ

## (undated) DEVICE — DRSG TELFA 3X8" 1238

## (undated) DEVICE — ESU PENCIL SMOKE EVAC W/ROCKER SWITCH 0703-047-000

## (undated) DEVICE — GLOVE LINER HALF FINGER NYLON KP5015/50

## (undated) DEVICE — RX BACITRACIN OINTMENT 0.9G 1/32OZ CUR001109

## (undated) DEVICE — SUCTION MANIFOLD NEPTUNE 2 SYS 4 PORT 0702-020-000

## (undated) DEVICE — DRSG GAUZE 4X4" TRAY 6939

## (undated) DEVICE — SU VICRYL 3-0 SH 27" UND J416H

## (undated) DEVICE — LINEN TOWEL PACK X6 WHITE 5487

## (undated) DEVICE — SU VICRYL 2-0 CT-1 27" UND J259H

## (undated) DEVICE — SU MONOCRYL 4-0 PS-2 27" UND Y426H

## (undated) DEVICE — SU PDS II 3-0 SH 27" Z316H

## (undated) DEVICE — DRAPE LEGGINGS CLEAR 8430

## (undated) DEVICE — ESU GROUND PAD ADULT W/CORD E7507

## (undated) DEVICE — PACK VAG HYST

## (undated) DEVICE — SUCTION MANIFOLD DORNOCH ULTRA CART UL-CL500

## (undated) DEVICE — SOL NACL 0.9% IRRIG 1000ML BOTTLE 2F7124

## (undated) DEVICE — LINEN TOWEL PACK X5 5464

## (undated) DEVICE — BLADE CLIPPER SGL USE 9680

## (undated) DEVICE — GLOVE PROTEXIS BLUE W/NEU-THERA 8.0  2D73EB80

## (undated) DEVICE — DECANTER VIAL 2006S

## (undated) DEVICE — SU SILK 2-0 SH 30" K833H

## (undated) DEVICE — SU VICRYL 2-0 CT-2 27" UND J269H

## (undated) DEVICE — GLOVE GAMMEX NEOPRENE ULTRA SZ 7.5 LF 8515

## (undated) DEVICE — SU VICRYL 2-0 TIE 12X18" J905T

## (undated) RX ORDER — PHENYLEPHRINE HCL IN 0.9% NACL 1 MG/10 ML
SYRINGE (ML) INTRAVENOUS
Status: DISPENSED
Start: 2020-06-03

## (undated) RX ORDER — EPHEDRINE SULFATE 50 MG/ML
INJECTION, SOLUTION INTRAMUSCULAR; INTRAVENOUS; SUBCUTANEOUS
Status: DISPENSED
Start: 2020-06-03

## (undated) RX ORDER — LIDOCAINE HYDROCHLORIDE 20 MG/ML
JELLY TOPICAL
Status: DISPENSED
Start: 2020-06-03

## (undated) RX ORDER — FENTANYL CITRATE 50 UG/ML
INJECTION, SOLUTION INTRAMUSCULAR; INTRAVENOUS
Status: DISPENSED
Start: 2021-03-26

## (undated) RX ORDER — DEXAMETHASONE SODIUM PHOSPHATE 4 MG/ML
INJECTION, SOLUTION INTRA-ARTICULAR; INTRALESIONAL; INTRAMUSCULAR; INTRAVENOUS; SOFT TISSUE
Status: DISPENSED
Start: 2020-06-03

## (undated) RX ORDER — FENTANYL CITRATE 50 UG/ML
INJECTION, SOLUTION INTRAMUSCULAR; INTRAVENOUS
Status: DISPENSED
Start: 2020-06-03

## (undated) RX ORDER — LIDOCAINE HYDROCHLORIDE 20 MG/ML
INJECTION, SOLUTION EPIDURAL; INFILTRATION; INTRACAUDAL; PERINEURAL
Status: DISPENSED
Start: 2020-06-03

## (undated) RX ORDER — PROPOFOL 10 MG/ML
INJECTION, EMULSION INTRAVENOUS
Status: DISPENSED
Start: 2020-06-03

## (undated) RX ORDER — ACETAMINOPHEN 325 MG/1
TABLET ORAL
Status: DISPENSED
Start: 2020-06-03

## (undated) RX ORDER — IODINE AND POTASSIUM IODIDE 50; 100 MG/ML; MG/ML
LIQUID ORAL
Status: DISPENSED
Start: 2020-06-03

## (undated) RX ORDER — ONDANSETRON 2 MG/ML
INJECTION INTRAMUSCULAR; INTRAVENOUS
Status: DISPENSED
Start: 2020-06-03

## (undated) RX ORDER — CIPROFLOXACIN 2 MG/ML
INJECTION, SOLUTION INTRAVENOUS
Status: DISPENSED
Start: 2020-06-03

## (undated) RX ORDER — ACETIC ACID 5 %
LIQUID (ML) MISCELLANEOUS
Status: DISPENSED
Start: 2021-03-26

## (undated) RX ORDER — PHENAZOPYRIDINE HYDROCHLORIDE 200 MG/1
TABLET, FILM COATED ORAL
Status: DISPENSED
Start: 2020-06-03

## (undated) RX ORDER — ACETIC ACID 5 %
LIQUID (ML) MISCELLANEOUS
Status: DISPENSED
Start: 2020-06-03